# Patient Record
Sex: FEMALE | Race: WHITE | NOT HISPANIC OR LATINO | Employment: FULL TIME | URBAN - METROPOLITAN AREA
[De-identification: names, ages, dates, MRNs, and addresses within clinical notes are randomized per-mention and may not be internally consistent; named-entity substitution may affect disease eponyms.]

---

## 2018-08-21 ENCOUNTER — EMPLOYEE HEALTH (OUTPATIENT)
Dept: OCCUPATIONAL MEDICINE | Facility: CLINIC | Age: 33
End: 2018-08-21

## 2018-08-21 ENCOUNTER — HOSPITAL ENCOUNTER (OUTPATIENT)
Facility: MEDICAL CENTER | Age: 33
End: 2018-08-21
Attending: PREVENTIVE MEDICINE
Payer: COMMERCIAL

## 2018-08-21 ENCOUNTER — APPOINTMENT (OUTPATIENT)
Dept: OCCUPATIONAL MEDICINE | Facility: CLINIC | Age: 33
End: 2018-08-21

## 2018-08-21 VITALS
DIASTOLIC BLOOD PRESSURE: 82 MMHG | TEMPERATURE: 98.3 F | BODY MASS INDEX: 19.77 KG/M2 | SYSTOLIC BLOOD PRESSURE: 122 MMHG | HEIGHT: 66 IN | OXYGEN SATURATION: 96 % | RESPIRATION RATE: 14 BRPM | HEART RATE: 96 BPM | WEIGHT: 123 LBS

## 2018-08-21 DIAGNOSIS — Z02.89 ENCOUNTER FOR OCCUPATIONAL HEALTH EXAMINATION: ICD-10-CM

## 2018-08-21 DIAGNOSIS — Z02.1 PRE-EMPLOYMENT DRUG SCREENING: ICD-10-CM

## 2018-08-21 LAB
AMP AMPHETAMINE: NORMAL
BAR BARBITURATES: NORMAL
BZO BENZODIAZEPINES: NORMAL
COC COCAINE: NORMAL
INT CON NEG: NORMAL
INT CON POS: NORMAL
MDMA ECSTASY: NORMAL
MET METHAMPHETAMINES: NORMAL
MTD METHADONE: NORMAL
OPI OPIATES: NORMAL
OXY OXYCODONE: NORMAL
PCP PHENCYCLIDINE: NORMAL
POC URINE DRUG SCREEN OCDRS: NORMAL
THC: NORMAL

## 2018-08-21 PROCEDURE — 80305 DRUG TEST PRSMV DIR OPT OBS: CPT | Performed by: PREVENTIVE MEDICINE

## 2018-08-21 PROCEDURE — 8915 PR COMPREHENSIVE PHYSICAL: Performed by: PREVENTIVE MEDICINE

## 2018-08-21 PROCEDURE — 94375 RESPIRATORY FLOW VOLUME LOOP: CPT | Performed by: PREVENTIVE MEDICINE

## 2018-08-21 PROCEDURE — 86480 TB TEST CELL IMMUN MEASURE: CPT | Performed by: PREVENTIVE MEDICINE

## 2018-08-21 PROCEDURE — 90707 MMR VACCINE SC: CPT | Performed by: PREVENTIVE MEDICINE

## 2018-08-21 NOTE — PROGRESS NOTES
Patient's body mass index is 19.85 kg/m². Exercise and nutrition counseling were performed at this visit.

## 2018-08-22 LAB
M TB TUBERC IFN-G BLD QL: NEGATIVE
M TB TUBERC IFN-G/MITOGEN IGNF BLD: 0.03
M TB TUBERC IGNF/MITOGEN IGNF CONTROL: 60.17 [IU]/ML
MITOGEN IGNF BCKGRD COR BLD-ACNC: 0.17 [IU]/ML

## 2018-09-13 ENCOUNTER — DOCUMENTATION (OUTPATIENT)
Dept: OCCUPATIONAL MEDICINE | Facility: CLINIC | Age: 33
End: 2018-09-13

## 2018-09-19 ENCOUNTER — IMMUNIZATION (OUTPATIENT)
Dept: OCCUPATIONAL MEDICINE | Facility: CLINIC | Age: 33
End: 2018-09-19
Payer: COMMERCIAL

## 2018-09-19 DIAGNOSIS — Z23 NEED FOR VACCINATION: ICD-10-CM

## 2018-09-19 PROCEDURE — 90686 IIV4 VACC NO PRSV 0.5 ML IM: CPT | Performed by: PREVENTIVE MEDICINE

## 2018-09-21 ENCOUNTER — EH NON-PROVIDER (OUTPATIENT)
Dept: OCCUPATIONAL MEDICINE | Facility: CLINIC | Age: 33
End: 2018-09-21

## 2018-09-21 DIAGNOSIS — Z23 NEED FOR VACCINATION: ICD-10-CM

## 2018-09-21 PROCEDURE — 90707 MMR VACCINE SC: CPT | Performed by: PREVENTIVE MEDICINE

## 2018-09-21 NOTE — PROGRESS NOTES
"Divina Hassan is a 33 y.o. female here for a non-provider visit for:   MMR 2 of 2    Reason for immunization: lack of immunity demonstrated on prior labs or testing  Immunization records indicate need for vaccine: Yes, confirmed with Epic  Minimum interval has been met for this vaccine: Yes  ABN completed: Not Indicated    Order and dose verified by: AED  VIS Dated  2- was given to patient: Yes  All IAC Questionnaire questions were answered \"No.\"    Patient tolerated injection and no adverse effects were observed or reported: Yes    Pt scheduled for next dose in series: Not Indicated    "

## 2018-12-10 ENCOUNTER — OFFICE VISIT (OUTPATIENT)
Dept: URGENT CARE | Facility: CLINIC | Age: 33
End: 2018-12-10
Payer: COMMERCIAL

## 2018-12-10 VITALS
TEMPERATURE: 97.6 F | BODY MASS INDEX: 19.77 KG/M2 | RESPIRATION RATE: 16 BRPM | DIASTOLIC BLOOD PRESSURE: 60 MMHG | WEIGHT: 123 LBS | HEIGHT: 66 IN | SYSTOLIC BLOOD PRESSURE: 106 MMHG | HEART RATE: 66 BPM | OXYGEN SATURATION: 99 %

## 2018-12-10 DIAGNOSIS — R10.9 BELLY PAIN: ICD-10-CM

## 2018-12-10 LAB
APPEARANCE UR: CLEAR
BILIRUB UR STRIP-MCNC: NEGATIVE MG/DL
COLOR UR AUTO: YELLOW
GLUCOSE UR STRIP.AUTO-MCNC: NEGATIVE MG/DL
INT CON NEG: NEGATIVE
INT CON POS: POSITIVE
KETONES UR STRIP.AUTO-MCNC: NEGATIVE MG/DL
LEUKOCYTE ESTERASE UR QL STRIP.AUTO: NEGATIVE
NITRITE UR QL STRIP.AUTO: NEGATIVE
PH UR STRIP.AUTO: 5.5 [PH] (ref 5–8)
POC URINE PREGNANCY TEST: NEGATIVE
PROT UR QL STRIP: NEGATIVE MG/DL
RBC UR QL AUTO: NEGATIVE
SP GR UR STRIP.AUTO: 1
UROBILINOGEN UR STRIP-MCNC: 0.2 MG/DL

## 2018-12-10 PROCEDURE — 81002 URINALYSIS NONAUTO W/O SCOPE: CPT | Performed by: FAMILY MEDICINE

## 2018-12-10 PROCEDURE — 81025 URINE PREGNANCY TEST: CPT | Performed by: FAMILY MEDICINE

## 2018-12-10 PROCEDURE — 99204 OFFICE O/P NEW MOD 45 MIN: CPT | Performed by: FAMILY MEDICINE

## 2018-12-10 RX ORDER — NORGESTIMATE AND ETHINYL ESTRADIOL 0.25-0.035
1 KIT ORAL DAILY
COMMUNITY
End: 2020-06-15

## 2018-12-10 ASSESSMENT — ENCOUNTER SYMPTOMS
DIARRHEA: 0
NAUSEA: 0
DIZZINESS: 0
VOMITING: 0
BLOOD IN STOOL: 0
FEVER: 0
CHILLS: 0
ABDOMINAL PAIN: 1
FOCAL WEAKNESS: 0

## 2018-12-11 NOTE — PROGRESS NOTES
"Subjective:      Divina Hassan is a 33 y.o. female who presents with GI Problem (x2 weeks RRQ, comes and goes, cramping pain, when it first started it radiates to the left side. )    - This is a very pleasant, well and non-toxic appearing 33 y.o. female with complaints of RUQ pain constant x 2-3 wks. Nothing makes it better or worse. No NVFC (vomited once after thanks giving dinner ). No stool changes or urinary symptoms or weight changes. Eating well           ALLERGIES:  Patient has no known allergies.     PMH:  History reviewed. No pertinent past medical history.     MEDS:    Current Outpatient Prescriptions:   •  norgestimate-ethinyl estradiol (SPRINTEC 28) 0.25-35 MG-MCG per tablet, Take 1 Tab by mouth every day., Disp: , Rfl:   •  estropipate (OGEN) 1.5 MG Tab, Take 1.5 mg by mouth every day., Disp: , Rfl:     ** I have documented what I find to be significant in regards to past medical, social, family and surgical history  in my HPI or under PMH/PSH/FH review section, otherwise it is contributory **             HPI    Review of Systems   Constitutional: Negative for chills and fever.   Gastrointestinal: Positive for abdominal pain. Negative for blood in stool, diarrhea, nausea and vomiting.   Neurological: Negative for dizziness and focal weakness.          Objective:     /60   Pulse 66   Temp 36.4 °C (97.6 °F) (Temporal)   Resp 16   Ht 1.676 m (5' 6\")   Wt 55.8 kg (123 lb)   SpO2 99%   BMI 19.85 kg/m²      Physical Exam   Constitutional: She appears well-developed. No distress.   HENT:   Head: Normocephalic and atraumatic.   Mouth/Throat: Oropharynx is clear and moist.   Eyes: Conjunctivae are normal.   Neck: Neck supple.   Cardiovascular: Regular rhythm.    No murmur heard.  Pulmonary/Chest: Effort normal and breath sounds normal. No respiratory distress.   Abdominal: Soft. Bowel sounds are normal. She exhibits no distension. There is no tenderness. There is no rebound and no guarding. "   Neurological: She is alert. She exhibits normal muscle tone.   Skin: Skin is warm and dry.   Psychiatric: She has a normal mood and affect. Judgment normal.   Nursing note and vitals reviewed.              Assessment/Plan:         1. Belly pain  POCT Urinalysis    POCT Pregnancy    CBC WITH DIFFERENTIAL    COMP METABOLIC PANEL    TSH WITH REFLEX TO FT4    LIPASE    US-ABDOMEN COMPLETE SURVEY    REFERRAL TO GASTROENTEROLOGY             Dx & d/c instructions discussed w/ patient and/or family members.     ER precautions (worsening signs symptoms and when to go to ER) discussed.    Follow up w/ PCP in 2-3 days to make sure symptoms improving and no further intervention/treatment and/or work-up needed was advised, ER if feeling worse or not improving in 2 days.    Possible side effects (i.e. Rash, GI upset/constipation, sedation, elevation of BP or sugars) of any medications given discussed.     Patient left in stable condition

## 2018-12-12 ENCOUNTER — HOSPITAL ENCOUNTER (OUTPATIENT)
Dept: LAB | Facility: MEDICAL CENTER | Age: 33
End: 2018-12-12
Attending: FAMILY MEDICINE
Payer: COMMERCIAL

## 2018-12-12 DIAGNOSIS — R10.9 BELLY PAIN: ICD-10-CM

## 2018-12-12 LAB
ALBUMIN SERPL BCP-MCNC: 4.2 G/DL (ref 3.2–4.9)
ALBUMIN/GLOB SERPL: 1.4 G/DL
ALP SERPL-CCNC: 39 U/L (ref 30–99)
ALT SERPL-CCNC: 15 U/L (ref 2–50)
ANION GAP SERPL CALC-SCNC: 8 MMOL/L (ref 0–11.9)
AST SERPL-CCNC: 19 U/L (ref 12–45)
BASOPHILS # BLD AUTO: 1 % (ref 0–1.8)
BASOPHILS # BLD: 0.06 K/UL (ref 0–0.12)
BILIRUB SERPL-MCNC: 0.7 MG/DL (ref 0.1–1.5)
BUN SERPL-MCNC: 11 MG/DL (ref 8–22)
CALCIUM SERPL-MCNC: 10 MG/DL (ref 8.5–10.5)
CHLORIDE SERPL-SCNC: 102 MMOL/L (ref 96–112)
CO2 SERPL-SCNC: 27 MMOL/L (ref 20–33)
CREAT SERPL-MCNC: 0.86 MG/DL (ref 0.5–1.4)
EOSINOPHIL # BLD AUTO: 0.13 K/UL (ref 0–0.51)
EOSINOPHIL NFR BLD: 2.2 % (ref 0–6.9)
ERYTHROCYTE [DISTWIDTH] IN BLOOD BY AUTOMATED COUNT: 38.9 FL (ref 35.9–50)
GLOBULIN SER CALC-MCNC: 2.9 G/DL (ref 1.9–3.5)
GLUCOSE SERPL-MCNC: 82 MG/DL (ref 65–99)
HCT VFR BLD AUTO: 42.2 % (ref 37–47)
HGB BLD-MCNC: 14.1 G/DL (ref 12–16)
IMM GRANULOCYTES # BLD AUTO: 0.01 K/UL (ref 0–0.11)
IMM GRANULOCYTES NFR BLD AUTO: 0.2 % (ref 0–0.9)
LIPASE SERPL-CCNC: 43 U/L (ref 11–82)
LYMPHOCYTES # BLD AUTO: 1.9 K/UL (ref 1–4.8)
LYMPHOCYTES NFR BLD: 31.7 % (ref 22–41)
MCH RBC QN AUTO: 29.3 PG (ref 27–33)
MCHC RBC AUTO-ENTMCNC: 33.4 G/DL (ref 33.6–35)
MCV RBC AUTO: 87.7 FL (ref 81.4–97.8)
MONOCYTES # BLD AUTO: 0.38 K/UL (ref 0–0.85)
MONOCYTES NFR BLD AUTO: 6.3 % (ref 0–13.4)
NEUTROPHILS # BLD AUTO: 3.51 K/UL (ref 2–7.15)
NEUTROPHILS NFR BLD: 58.6 % (ref 44–72)
NRBC # BLD AUTO: 0 K/UL
NRBC BLD-RTO: 0 /100 WBC
PLATELET # BLD AUTO: 261 K/UL (ref 164–446)
PMV BLD AUTO: 9.7 FL (ref 9–12.9)
POTASSIUM SERPL-SCNC: 4.1 MMOL/L (ref 3.6–5.5)
PROT SERPL-MCNC: 7.1 G/DL (ref 6–8.2)
RBC # BLD AUTO: 4.81 M/UL (ref 4.2–5.4)
SODIUM SERPL-SCNC: 137 MMOL/L (ref 135–145)
TSH SERPL DL<=0.005 MIU/L-ACNC: 2.13 UIU/ML (ref 0.38–5.33)
WBC # BLD AUTO: 6 K/UL (ref 4.8–10.8)

## 2018-12-12 PROCEDURE — 85025 COMPLETE CBC W/AUTO DIFF WBC: CPT

## 2018-12-12 PROCEDURE — 83690 ASSAY OF LIPASE: CPT

## 2018-12-12 PROCEDURE — 80053 COMPREHEN METABOLIC PANEL: CPT

## 2018-12-12 PROCEDURE — 84443 ASSAY THYROID STIM HORMONE: CPT

## 2018-12-12 PROCEDURE — 36415 COLL VENOUS BLD VENIPUNCTURE: CPT

## 2019-04-09 ENCOUNTER — HOSPITAL ENCOUNTER (OUTPATIENT)
Dept: LAB | Facility: MEDICAL CENTER | Age: 34
End: 2019-04-09
Attending: OBSTETRICS & GYNECOLOGY
Payer: COMMERCIAL

## 2019-04-09 LAB
HBV SURFACE AG SER QL: NEGATIVE
HIV 1+2 AB+HIV1 P24 AG SERPL QL IA: NON REACTIVE
TREPONEMA PALLIDUM IGG+IGM AB [PRESENCE] IN SERUM OR PLASMA BY IMMUNOASSAY: NON REACTIVE

## 2019-04-09 PROCEDURE — 87389 HIV-1 AG W/HIV-1&-2 AB AG IA: CPT

## 2019-04-09 PROCEDURE — 87340 HEPATITIS B SURFACE AG IA: CPT

## 2019-04-09 PROCEDURE — 86780 TREPONEMA PALLIDUM: CPT

## 2019-05-28 ENCOUNTER — OFFICE VISIT (OUTPATIENT)
Dept: URGENT CARE | Facility: CLINIC | Age: 34
End: 2019-05-28
Payer: COMMERCIAL

## 2019-05-28 ENCOUNTER — APPOINTMENT (OUTPATIENT)
Dept: RADIOLOGY | Facility: IMAGING CENTER | Age: 34
End: 2019-05-28
Attending: PHYSICIAN ASSISTANT
Payer: COMMERCIAL

## 2019-05-28 VITALS
HEIGHT: 66 IN | HEART RATE: 88 BPM | OXYGEN SATURATION: 97 % | DIASTOLIC BLOOD PRESSURE: 74 MMHG | SYSTOLIC BLOOD PRESSURE: 118 MMHG | TEMPERATURE: 97.5 F | WEIGHT: 127 LBS | RESPIRATION RATE: 16 BRPM | BODY MASS INDEX: 20.41 KG/M2

## 2019-05-28 DIAGNOSIS — M79.672 PAIN OF LEFT HEEL: ICD-10-CM

## 2019-05-28 PROCEDURE — 73650 X-RAY EXAM OF HEEL: CPT | Mod: TC,LT | Performed by: PHYSICIAN ASSISTANT

## 2019-05-28 PROCEDURE — 99214 OFFICE O/P EST MOD 30 MIN: CPT | Performed by: PHYSICIAN ASSISTANT

## 2019-05-28 RX ORDER — IBUPROFEN 600 MG/1
600 TABLET ORAL EVERY 6 HOURS PRN
COMMUNITY
End: 2020-06-15

## 2019-05-28 ASSESSMENT — ENCOUNTER SYMPTOMS
TINGLING: 0
SENSORY CHANGE: 0
FALLS: 0
JOINT SWELLING: 0

## 2019-05-28 NOTE — PROGRESS NOTES
"Subjective:      Divina Hassan is a 34 y.o. female who presents with Other (pt states she was running and her (L) heel started hurting and radiating lateral)            Patient is a pleasant 34-year-old female who presents to urgent care with left heel pain for the last 2 weeks.  Patient reports that she is an avid runner of which approximately 2 weeks ago she was running on the treadmill when she developed acute heel pain and stopped running.  Since then she reports worsening heel pain now radiating to the lateral aspect of the foot with any weightbearing.  Patient denies any swelling, skin changes or history of same.  Patient also has been utilizing shoe inserts without relief his symptoms.      Other   This is a new problem. Episode onset: 2 weeks ago. The problem occurs constantly. The problem has been unchanged. Pertinent negatives include no joint swelling. The symptoms are aggravated by walking (Weightbearing). She has tried rest for the symptoms. The treatment provided no relief.       Review of Systems   Musculoskeletal: Positive for joint pain. Negative for falls and joint swelling.   Neurological: Negative for tingling and sensory change.   All other systems reviewed and are negative.         Objective:     /74 (BP Location: Left arm, Patient Position: Sitting, BP Cuff Size: Adult)   Pulse 88   Temp 36.4 °C (97.5 °F) (Temporal)   Resp 16   Ht 1.676 m (5' 6\")   Wt 57.6 kg (127 lb)   SpO2 97%   BMI 20.50 kg/m²    PMH:  has no past medical history on file.  MEDS:   Current Outpatient Prescriptions:   •  ibuprofen (MOTRIN) 600 MG Tab, Take 600 mg by mouth every 6 hours as needed., Disp: , Rfl:   •  norgestimate-ethinyl estradiol (SPRINTEC 28) 0.25-35 MG-MCG per tablet, Take 1 Tab by mouth every day., Disp: , Rfl:   •  estropipate (OGEN) 1.5 MG Tab, Take 1.5 mg by mouth every day., Disp: , Rfl:   ALLERGIES: No Known Allergies  SURGHX: No past surgical history on file.  SOCHX:  reports that " she has never smoked. She has never used smokeless tobacco. She reports that she drinks alcohol. She reports that she does not use drugs.  FH: Family history was reviewed, no pertinent findings to report    Physical Exam   Constitutional: She is oriented to person, place, and time. She appears well-developed and well-nourished. No distress.   HENT:   Head: Normocephalic and atraumatic.   Eyes: Pupils are equal, round, and reactive to light. EOM are normal.   Neck: Normal range of motion. Neck supple. No tracheal deviation present.   Cardiovascular: Normal rate.    Pulmonary/Chest: Effort normal.   Musculoskeletal:        Left foot: There is tenderness and bony tenderness. There is normal range of motion and no swelling.        Feet:    Left heel without noted erythema, edema or foreign body.  Tenderness at the base of the heel and to the caudal aspect of the fifth metatarsal.  Dorsi plantarflexion intact.  Achilles nontender.  Neurovascularly intact.  Digits nontender.   Neurological: She is alert and oriented to person, place, and time.   Skin: Skin is warm. No rash noted.   Psychiatric: She has a normal mood and affect. Her behavior is normal. Judgment and thought content normal.   Vitals reviewed.              Assessment/Plan:     1. Pain of left heel  - DX-OS CALCIS (HEEL) 2+ LEFT; Future  - REFERRAL TO SPORTS MEDICINE  Xr heel:  No acute fracture.  Normal trabecular pattern.  No bony spurring.  Achilles shadow is within normal limits.    I have reviewed the xray and agree with the official read.  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  Patient was placed in a walking boot with notable improvement of ambulation.  Referral to sports medicine was made today for further evaluation.  Symptoms consistent with stress fracture versus plantar fasciitis however patient does not feel that the pain is in the distribution or plantar fasciitis.  Encourage patient to follow-up with sports  medicine.  Patient given precautionary s/sx that mandate immediate follow up and evaluation in the ED. Advised of risks of not doing so.    DDX, Supportive care, and indications for immediate follow-up discussed with patient.    Instructed to return to clinic or nearest emergency department if we are not available for any change in condition, further concerns, or worsening of symptoms.    The patient demonstrated a good understanding and agreed with the treatment plan.  Please note that this dictation was created using voice recognition software. I have made every reasonable attempt to correct obvious errors, but I expect that there are errors of grammar and possibly content that I did not discover before finalizing the note.

## 2019-09-25 ENCOUNTER — HOSPITAL ENCOUNTER (OUTPATIENT)
Dept: RADIOLOGY | Facility: MEDICAL CENTER | Age: 34
End: 2019-09-25
Attending: OBSTETRICS & GYNECOLOGY
Payer: COMMERCIAL

## 2019-09-25 DIAGNOSIS — N64.4 BREAST PAIN: ICD-10-CM

## 2019-09-25 PROCEDURE — 76642 ULTRASOUND BREAST LIMITED: CPT | Mod: RT

## 2019-10-03 ENCOUNTER — IMMUNIZATION (OUTPATIENT)
Dept: OCCUPATIONAL MEDICINE | Facility: CLINIC | Age: 34
End: 2019-10-03

## 2019-10-03 DIAGNOSIS — Z23 NEED FOR VACCINATION: ICD-10-CM

## 2019-10-03 PROCEDURE — 90686 IIV4 VACC NO PRSV 0.5 ML IM: CPT | Performed by: NURSE PRACTITIONER

## 2020-06-15 ENCOUNTER — TELEMEDICINE (OUTPATIENT)
Dept: MEDICAL GROUP | Facility: IMAGING CENTER | Age: 35
End: 2020-06-15
Payer: COMMERCIAL

## 2020-06-15 VITALS — HEIGHT: 66 IN | BODY MASS INDEX: 20.89 KG/M2 | WEIGHT: 130 LBS | RESPIRATION RATE: 12 BRPM

## 2020-06-15 DIAGNOSIS — D17.1 LIPOMA OF TORSO: ICD-10-CM

## 2020-06-15 DIAGNOSIS — Z30.011 ENCOUNTER FOR INITIAL PRESCRIPTION OF CONTRACEPTIVE PILLS: ICD-10-CM

## 2020-06-15 DIAGNOSIS — Z12.83 SKIN CANCER SCREENING: ICD-10-CM

## 2020-06-15 PROCEDURE — 99213 OFFICE O/P EST LOW 20 MIN: CPT | Performed by: FAMILY MEDICINE

## 2020-06-15 RX ORDER — NORETHINDRONE ACETATE AND ETHINYL ESTRADIOL .02; 1 MG/1; MG/1
1 TABLET ORAL DAILY
Qty: 84 TAB | Refills: 3 | Status: SHIPPED | OUTPATIENT
Start: 2020-06-15 | End: 2020-07-13

## 2020-06-15 RX ORDER — M-VIT,TX,IRON,MINS/CALC/FOLIC 27MG-0.4MG
1 TABLET ORAL DAILY
COMMUNITY
End: 2021-08-26

## 2020-06-15 ASSESSMENT — PAIN SCALES - GENERAL: PAINLEVEL: NO PAIN

## 2020-06-15 ASSESSMENT — FIBROSIS 4 INDEX: FIB4 SCORE: 0.62

## 2020-06-15 NOTE — PROGRESS NOTES
Telemedicine Visit: New Patient     This encounter was conducted via Zoom .   Verbal consent was obtained. Patient's identity was verified. Patient aware this will be   billed the same as an in person evaluation.    Subjective:     Chief Complaint   Patient presents with   • Establish Care     Moved to [place] 2 years ago moved from [place].   • Contraception     change birth control.  low dose. bad period symptoms.    • Referral Needed     dermatology. lipoma on her back.    • Other     Covid positive in march. since tested negative. donated plasma. 117/72.    • Other     Dr. Pepper at [facility]. wednesday appt. last pap smear 2 years ago. 1 abnormal pap in 2013. AbhijitAnnette Hassan is a pleasant 35 y.o. female with no chronic medical conditions on virtual visit to discuss referral to derm and changing her birth control.     Would like to go to a low dose birth control bc of heavy bleeding and headaches and cramping that occur when she is on her off week of birth control.  Does get migraine headaches though denies auras.  She is a neurology nurse practitioner.     Would like referral due lipomas on back would like removed.  Has some discomfort due to bra strap and location on the scapula.    Migraines with aura: She gets migraines on her withdrawal week though no aura  Liver disease: None  Personal or family history of stroke, pulmonary embolism, DVT, or any blood disorders in the family: None  Smoking: None  Sedentary lifestyle: Active    ROS  See HPI  Constitutional: Negative for fever, chills and malaise/fatigue.   HENT: Negative for congestion, itchy watery eyes  Eyes: Negative for pain or sudden changes in vision  Respiratory: Negative for cough and shortness of breath.    Cardiovascular: Negative for leg swelling or chest pain  Gastrointestinal: Negative for nausea, vomiting, abdominal pain and diarrhea.   Genitourinary: Negative for dysuria and hematuria.   Skin: Negative for rash or concerning moles  "  Neurological: Negative for dizziness, focal weakness   Psychiatric/Behavioral: Negative for depression.  The patient is not nervous/anxious.    Musculoskeletal: no weakness or joint stiffness    No Known Allergies    Current medicines (including changes today)  Current Outpatient Medications   Medication Sig Dispense Refill   • therapeutic multivitamin-minerals (THERAGRAN-M) Tab Take 1 Tab by mouth every day.     • norgestimate-ethinyl estradiol (SPRINTEC 28) 0.25-35 MG-MCG per tablet Take 1 Tab by mouth every day.       No current facility-administered medications for this visit.        She  has no past medical history on file.  She  has no past surgical history on file.      History reviewed. No pertinent family history.  No family status information on file.       There are no active problems to display for this patient.         Objective:   Vitals obtained by patient:  Resp 12   Ht 1.676 m (5' 6\")   Wt 59 kg (130 lb)   BMI 20.98 kg/m²     Physical Exam:  Constitutional: Alert, no distress, well-groomed.  Skin: No rashes in visible areas.  Eye: Round. Conjunctiva clear, lids normal. No icterus.   ENMT: Lips pink without lesions, good dentition, moist mucous membranes. Phonation normal.  Neck: No masses, no thyromegaly. Moves freely without pain.  CV: Pulse as reported by patient  Respiratory: Unlabored respiratory effort, no cough or audible wheeze  Psych: Alert and oriented x3, normal affect and mood.   Neuro: symmetric face. Alert and oriented. Follows commands. No aphasia or dysarthria.    Labs:  No visits with results within 1 Month(s) from this visit.   Latest known visit with results is:   Hospital Outpatient Visit on 04/09/2019   Component Date Value Ref Range Status   • Syphilis, Treponemal Qual 04/09/2019 Non Reactive  Non Reactive Final    Comment: Result of Non-reactive indicates no serologic evidence of  infection with Treponema pallidum.  (Incubating or early  primary syphilis cannot be " excluded).     • Hepatitis B Surface Antigen 04/09/2019 Negative  Negative Final    Comment: It has been reported that certain assays will not detect all HBV  mutants.  If acute or chronic HBV infection is suspected and the  HBsAg result is negative, it is recommended that other serological  markers be tested to confirm the HBsAg nonreactivity.  HBsAg test  results should always be assessed in conjunction with the patient's  medical history, clinical examination, and other findings.     • HIV Ag/Ab Combo Assay 04/09/2019 Non Reactive  Non Reactive Final    Comment: Screen is NEGATIVE for p24 antigen and HIV 1/O/2 antibodies.  A negative screen does not preclude the possibility of exposure  or infection.  Consider retesting in high-risk patients, if  clinically indicated.       Imaging:   No results found.    Assessment and Plan:   The following treatment plan was discussed:   Recent lab work-up April 2020 with negative STD panel normal thyroid function CBC CMP normal  Divina prefers a lower hormone dose birth control given that her bmi is 20 I think this is reasonable. Will give the new bcp 3-6 months to see if it works better for her.   She will start after her next period to minimize throwing her cycle off. Caution to be used when switching birth control such as condoms  Problem List Items Addressed This Visit     None      Visit Diagnoses     Lipoma of torso        Relevant Orders    REFERRAL TO GENERAL SURGERY    Encounter for initial prescription of contraceptive pills        Relevant Medications    norethindrone-ethinyl estradiol (LOESTRIN 1/20, 21,) 1-20 MG-MCG per tablet    Skin cancer screening        Relevant Orders    REFERRAL TO DERMATOLOGY        Follow-up: Return if symptoms worsen or fail to improve, for annual.        Portions of this note may be dictated using Dragon [employer] Fusion voice recognition software.  Variances in spelling and vocabulary are possible and unintentional.  Not all areas  may be caught/corrected.  Please notify me if any discrepancies are noted or if the meaning of any statement is not correct/clear.

## 2020-07-08 ENCOUNTER — HOSPITAL ENCOUNTER (OUTPATIENT)
Dept: LAB | Facility: MEDICAL CENTER | Age: 35
End: 2020-07-08
Payer: COMMERCIAL

## 2020-07-08 LAB
BDY FAT % MEASURED: 22.4 %
BP DIAS: 79 MMHG
BP SYS: 112 MMHG
CHOLEST SERPL-MCNC: 163 MG/DL (ref 100–199)
DIABETES HTDIA: NO
EVENT NAME HTEVT: NORMAL
FASTING HTFAS: YES
GLUCOSE SERPL-MCNC: 86 MG/DL (ref 65–99)
HDLC SERPL-MCNC: 89 MG/DL
HYPERTENSION HTHYP: NO
LDLC SERPL CALC-MCNC: 64 MG/DL
SCREENING LOC CITY HTCIT: NORMAL
SCREENING LOC STATE HTSTA: NORMAL
SCREENING LOCATION HTLOC: NORMAL
SMOKING HTSMO: NO
SUBSCRIBER ID HTSID: NORMAL
TRIGL SERPL-MCNC: 49 MG/DL (ref 0–149)

## 2020-07-08 PROCEDURE — 82947 ASSAY GLUCOSE BLOOD QUANT: CPT

## 2020-07-08 PROCEDURE — S5190 WELLNESS ASSESSMENT BY NONPH: HCPCS

## 2020-07-08 PROCEDURE — 36415 COLL VENOUS BLD VENIPUNCTURE: CPT

## 2020-07-08 PROCEDURE — 80061 LIPID PANEL: CPT

## 2020-07-12 ENCOUNTER — PATIENT MESSAGE (OUTPATIENT)
Dept: MEDICAL GROUP | Facility: IMAGING CENTER | Age: 35
End: 2020-07-12

## 2020-07-12 DIAGNOSIS — Z30.011 ENCOUNTER FOR INITIAL PRESCRIPTION OF CONTRACEPTIVE PILLS: ICD-10-CM

## 2020-07-13 RX ORDER — NORGESTIMATE AND ETHINYL ESTRADIOL 7DAYSX3 LO
1 KIT ORAL DAILY
Qty: 84 TAB | Refills: 3 | Status: SHIPPED | OUTPATIENT
Start: 2020-07-13 | End: 2021-06-22

## 2020-08-05 ASSESSMENT — FIBROSIS 4 INDEX: FIB4 SCORE: 0.62

## 2020-08-07 ENCOUNTER — OFFICE VISIT (OUTPATIENT)
Dept: ADMISSIONS | Facility: MEDICAL CENTER | Age: 35
End: 2020-08-07
Attending: SURGERY
Payer: COMMERCIAL

## 2020-08-07 DIAGNOSIS — Z01.812 PRE-OPERATIVE LABORATORY EXAMINATION: ICD-10-CM

## 2020-08-07 LAB
COVID ORDER STATUS COVID19: NORMAL
SARS-COV-2 RNA RESP QL NAA+PROBE: NOTDETECTED
SPECIMEN SOURCE: NORMAL

## 2020-08-07 PROCEDURE — U0003 INFECTIOUS AGENT DETECTION BY NUCLEIC ACID (DNA OR RNA); SEVERE ACUTE RESPIRATORY SYNDROME CORONAVIRUS 2 (SARS-COV-2) (CORONAVIRUS DISEASE [COVID-19]), AMPLIFIED PROBE TECHNIQUE, MAKING USE OF HIGH THROUGHPUT TECHNOLOGIES AS DESCRIBED BY CMS-2020-01-R: HCPCS

## 2020-08-12 ENCOUNTER — HOSPITAL ENCOUNTER (OUTPATIENT)
Facility: MEDICAL CENTER | Age: 35
End: 2020-08-12
Attending: SURGERY | Admitting: SURGERY
Payer: COMMERCIAL

## 2020-08-12 ENCOUNTER — ANESTHESIA (OUTPATIENT)
Dept: SURGERY | Facility: MEDICAL CENTER | Age: 35
End: 2020-08-12
Payer: COMMERCIAL

## 2020-08-12 ENCOUNTER — ANESTHESIA EVENT (OUTPATIENT)
Dept: SURGERY | Facility: MEDICAL CENTER | Age: 35
End: 2020-08-12
Payer: COMMERCIAL

## 2020-08-12 VITALS
RESPIRATION RATE: 18 BRPM | HEART RATE: 85 BPM | SYSTOLIC BLOOD PRESSURE: 113 MMHG | HEIGHT: 66 IN | DIASTOLIC BLOOD PRESSURE: 70 MMHG | WEIGHT: 128.97 LBS | OXYGEN SATURATION: 100 % | BODY MASS INDEX: 20.73 KG/M2 | TEMPERATURE: 97.5 F

## 2020-08-12 DIAGNOSIS — G89.18 POST-OP PAIN: ICD-10-CM

## 2020-08-12 LAB
HCG UR QL: NEGATIVE
PATHOLOGY CONSULT NOTE: NORMAL

## 2020-08-12 PROCEDURE — 700105 HCHG RX REV CODE 258: Performed by: SURGERY

## 2020-08-12 PROCEDURE — A9270 NON-COVERED ITEM OR SERVICE: HCPCS | Performed by: SURGERY

## 2020-08-12 PROCEDURE — 160046 HCHG PACU - 1ST 60 MINS PHASE II: Performed by: SURGERY

## 2020-08-12 PROCEDURE — 700101 HCHG RX REV CODE 250: Performed by: ANESTHESIOLOGY

## 2020-08-12 PROCEDURE — 160048 HCHG OR STATISTICAL LEVEL 1-5: Performed by: SURGERY

## 2020-08-12 PROCEDURE — 160039 HCHG SURGERY MINUTES - EA ADDL 1 MIN LEVEL 3: Performed by: SURGERY

## 2020-08-12 PROCEDURE — 700111 HCHG RX REV CODE 636 W/ 250 OVERRIDE (IP)

## 2020-08-12 PROCEDURE — 700111 HCHG RX REV CODE 636 W/ 250 OVERRIDE (IP): Performed by: ANESTHESIOLOGY

## 2020-08-12 PROCEDURE — 160028 HCHG SURGERY MINUTES - 1ST 30 MINS LEVEL 3: Performed by: SURGERY

## 2020-08-12 PROCEDURE — 160009 HCHG ANES TIME/MIN: Performed by: SURGERY

## 2020-08-12 PROCEDURE — 160002 HCHG RECOVERY MINUTES (STAT): Performed by: SURGERY

## 2020-08-12 PROCEDURE — 160025 RECOVERY II MINUTES (STATS): Performed by: SURGERY

## 2020-08-12 PROCEDURE — 700101 HCHG RX REV CODE 250: Performed by: SURGERY

## 2020-08-12 PROCEDURE — 501445 HCHG STAPLER, SKIN DISP: Performed by: SURGERY

## 2020-08-12 PROCEDURE — 81025 URINE PREGNANCY TEST: CPT

## 2020-08-12 PROCEDURE — 501838 HCHG SUTURE GENERAL: Performed by: SURGERY

## 2020-08-12 PROCEDURE — 160035 HCHG PACU - 1ST 60 MINS PHASE I: Performed by: SURGERY

## 2020-08-12 PROCEDURE — 88304 TISSUE EXAM BY PATHOLOGIST: CPT

## 2020-08-12 PROCEDURE — 700102 HCHG RX REV CODE 250 W/ 637 OVERRIDE(OP): Performed by: SURGERY

## 2020-08-12 RX ORDER — HYDROCODONE BITARTRATE AND ACETAMINOPHEN 5; 325 MG/1; MG/1
1-2 TABLET ORAL EVERY 4 HOURS PRN
Qty: 8 TAB | Refills: 0 | Status: SHIPPED | OUTPATIENT
Start: 2020-08-12 | End: 2020-08-14

## 2020-08-12 RX ORDER — SODIUM CHLORIDE, SODIUM LACTATE, POTASSIUM CHLORIDE, CALCIUM CHLORIDE 600; 310; 30; 20 MG/100ML; MG/100ML; MG/100ML; MG/100ML
INJECTION, SOLUTION INTRAVENOUS CONTINUOUS
Status: DISCONTINUED | OUTPATIENT
Start: 2020-08-12 | End: 2020-08-12 | Stop reason: HOSPADM

## 2020-08-12 RX ORDER — OXYCODONE HCL 5 MG/5 ML
5 SOLUTION, ORAL ORAL
Status: DISCONTINUED | OUTPATIENT
Start: 2020-08-12 | End: 2020-08-12 | Stop reason: HOSPADM

## 2020-08-12 RX ORDER — HALOPERIDOL 5 MG/ML
1 INJECTION INTRAMUSCULAR
Status: DISCONTINUED | OUTPATIENT
Start: 2020-08-12 | End: 2020-08-12 | Stop reason: HOSPADM

## 2020-08-12 RX ORDER — DIPHENHYDRAMINE HYDROCHLORIDE 50 MG/ML
12.5 INJECTION INTRAMUSCULAR; INTRAVENOUS
Status: DISCONTINUED | OUTPATIENT
Start: 2020-08-12 | End: 2020-08-12 | Stop reason: HOSPADM

## 2020-08-12 RX ORDER — OXYCODONE HCL 5 MG/5 ML
10 SOLUTION, ORAL ORAL
Status: DISCONTINUED | OUTPATIENT
Start: 2020-08-12 | End: 2020-08-12 | Stop reason: HOSPADM

## 2020-08-12 RX ORDER — CEFAZOLIN SODIUM 1 G/3ML
INJECTION, POWDER, FOR SOLUTION INTRAMUSCULAR; INTRAVENOUS PRN
Status: DISCONTINUED | OUTPATIENT
Start: 2020-08-12 | End: 2020-08-12 | Stop reason: SURG

## 2020-08-12 RX ORDER — HYDROMORPHONE HYDROCHLORIDE 1 MG/ML
0.4 INJECTION, SOLUTION INTRAMUSCULAR; INTRAVENOUS; SUBCUTANEOUS
Status: DISCONTINUED | OUTPATIENT
Start: 2020-08-12 | End: 2020-08-12 | Stop reason: HOSPADM

## 2020-08-12 RX ORDER — ONDANSETRON 2 MG/ML
INJECTION INTRAMUSCULAR; INTRAVENOUS PRN
Status: DISCONTINUED | OUTPATIENT
Start: 2020-08-12 | End: 2020-08-12 | Stop reason: SURG

## 2020-08-12 RX ORDER — BUPIVACAINE HYDROCHLORIDE AND EPINEPHRINE 5; 5 MG/ML; UG/ML
INJECTION, SOLUTION EPIDURAL; INTRACAUDAL; PERINEURAL
Status: DISCONTINUED | OUTPATIENT
Start: 2020-08-12 | End: 2020-08-12 | Stop reason: HOSPADM

## 2020-08-12 RX ORDER — HYDROMORPHONE HYDROCHLORIDE 1 MG/ML
0.1 INJECTION, SOLUTION INTRAMUSCULAR; INTRAVENOUS; SUBCUTANEOUS
Status: DISCONTINUED | OUTPATIENT
Start: 2020-08-12 | End: 2020-08-12 | Stop reason: HOSPADM

## 2020-08-12 RX ORDER — ONDANSETRON 2 MG/ML
4 INJECTION INTRAMUSCULAR; INTRAVENOUS
Status: DISCONTINUED | OUTPATIENT
Start: 2020-08-12 | End: 2020-08-12 | Stop reason: HOSPADM

## 2020-08-12 RX ORDER — KETOROLAC TROMETHAMINE 30 MG/ML
INJECTION, SOLUTION INTRAMUSCULAR; INTRAVENOUS PRN
Status: DISCONTINUED | OUTPATIENT
Start: 2020-08-12 | End: 2020-08-12 | Stop reason: SURG

## 2020-08-12 RX ORDER — DEXAMETHASONE SODIUM PHOSPHATE 4 MG/ML
INJECTION, SOLUTION INTRA-ARTICULAR; INTRALESIONAL; INTRAMUSCULAR; INTRAVENOUS; SOFT TISSUE PRN
Status: DISCONTINUED | OUTPATIENT
Start: 2020-08-12 | End: 2020-08-12 | Stop reason: SURG

## 2020-08-12 RX ORDER — LIDOCAINE HYDROCHLORIDE 10 MG/ML
INJECTION, SOLUTION EPIDURAL; INFILTRATION; INTRACAUDAL; PERINEURAL
Status: COMPLETED
Start: 2020-08-12 | End: 2020-08-12

## 2020-08-12 RX ORDER — HYDROMORPHONE HYDROCHLORIDE 1 MG/ML
0.2 INJECTION, SOLUTION INTRAMUSCULAR; INTRAVENOUS; SUBCUTANEOUS
Status: DISCONTINUED | OUTPATIENT
Start: 2020-08-12 | End: 2020-08-12 | Stop reason: HOSPADM

## 2020-08-12 RX ORDER — MEPERIDINE HYDROCHLORIDE 25 MG/ML
12.5 INJECTION INTRAMUSCULAR; INTRAVENOUS; SUBCUTANEOUS
Status: DISCONTINUED | OUTPATIENT
Start: 2020-08-12 | End: 2020-08-12 | Stop reason: HOSPADM

## 2020-08-12 RX ADMIN — MEPERIDINE HYDROCHLORIDE 12.5 MG: 25 INJECTION INTRAMUSCULAR; INTRAVENOUS; SUBCUTANEOUS at 16:34

## 2020-08-12 RX ADMIN — PROPOFOL 200 MG: 10 INJECTION, EMULSION INTRAVENOUS at 15:27

## 2020-08-12 RX ADMIN — FENTANYL CITRATE 50 MCG: 50 INJECTION INTRAMUSCULAR; INTRAVENOUS at 15:53

## 2020-08-12 RX ADMIN — KETOROLAC TROMETHAMINE 30 MG: 30 INJECTION, SOLUTION INTRAMUSCULAR at 16:06

## 2020-08-12 RX ADMIN — ROCURONIUM BROMIDE 30 MG: 10 INJECTION, SOLUTION INTRAVENOUS at 15:27

## 2020-08-12 RX ADMIN — ONDANSETRON 4 MG: 2 INJECTION INTRAMUSCULAR; INTRAVENOUS at 16:05

## 2020-08-12 RX ADMIN — POVIDONE-IODINE 15 ML: 10 SOLUTION TOPICAL at 13:21

## 2020-08-12 RX ADMIN — SODIUM CHLORIDE, POTASSIUM CHLORIDE, SODIUM LACTATE AND CALCIUM CHLORIDE: 600; 310; 30; 20 INJECTION, SOLUTION INTRAVENOUS at 13:23

## 2020-08-12 RX ADMIN — FENTANYL CITRATE 50 MCG: 50 INJECTION INTRAMUSCULAR; INTRAVENOUS at 15:27

## 2020-08-12 RX ADMIN — Medication 0.5 ML: at 13:23

## 2020-08-12 RX ADMIN — PROPOFOL 100 MG: 10 INJECTION, EMULSION INTRAVENOUS at 15:40

## 2020-08-12 RX ADMIN — DEXAMETHASONE SODIUM PHOSPHATE 4 MG: 4 INJECTION, SOLUTION INTRA-ARTICULAR; INTRALESIONAL; INTRAMUSCULAR; INTRAVENOUS; SOFT TISSUE at 16:05

## 2020-08-12 RX ADMIN — SODIUM CHLORIDE, POTASSIUM CHLORIDE, SODIUM LACTATE AND CALCIUM CHLORIDE: 600; 310; 30; 20 INJECTION, SOLUTION INTRAVENOUS at 15:27

## 2020-08-12 RX ADMIN — SUGAMMADEX 200 MG: 100 INJECTION, SOLUTION INTRAVENOUS at 16:00

## 2020-08-12 RX ADMIN — LIDOCAINE HYDROCHLORIDE 0.5 ML: 10 INJECTION, SOLUTION EPIDURAL; INFILTRATION; INTRACAUDAL at 13:23

## 2020-08-12 RX ADMIN — CEFAZOLIN 2 G: 330 INJECTION, POWDER, FOR SOLUTION INTRAMUSCULAR; INTRAVENOUS at 15:39

## 2020-08-12 RX ADMIN — PROPOFOL 100 MG: 10 INJECTION, EMULSION INTRAVENOUS at 15:53

## 2020-08-12 ASSESSMENT — FIBROSIS 4 INDEX: FIB4 SCORE: 0.62

## 2020-08-12 ASSESSMENT — PAIN SCALES - GENERAL: PAIN_LEVEL: 3

## 2020-08-12 NOTE — OP REPORT
08/12/20    OPERATIVE NOTE    PRE-OPERATIVE DIAGNOSIS - Large Lipoma Back     POST-OPERATIVE DIAGNOSIS - Same    PROCEDURE PERFORMED - Excision large lipoma back 11cm X 6cm     SURGEON - Juve Jensen M.D.    ASSISTANT - SINAN Rubio    TYPE OF ANESTHESIA - General     ANESTHESIOLOGIST  - Dr. Karimi       SPECIMENS - Lipoma       PROCEDURE IN DETAIL -     Patient was taken to the operating suite placed in the supine position.  After adequate anesthesia the patient was repositioned in the prone position.  Great care was taken to offload any pressure points and keep the patient safe.  The patient's back was prepped and draped in sterile fashion.  An incision was made in transverse fashion overlying the mass which was on the right aspect of the patient's back overlying the scapula.  The incision was carried down to the subcutaneous tissue and the lipoma was easily identified and was noted to be fairly well encapsulated.  The lipoma was excised in its entirety using electrocautery and blunt dissection.  The lipoma did not track underneath the scapula.  Once the lipoma was excised completely which measured 11 cm x 6 cm the area was irrigated with copious amounts of normal saline and hemostasis was confirmed.  Half percent Marcaine with epinephrine was infiltrated into the surrounding tissue for postoperative comfort.  3-0 Vicryl subcutaneous sutures were placed in a 4-0 strata fix suture was then used to reapproximate the skin incision.  Benzoin Steri-Strips and sterile dressings were applied.          Juve Jensen M.D.

## 2020-08-12 NOTE — H&P
General Surgery H&P  -------------------------------------------------------------------------------------------------  Date: 8/12/2020    Surgeon: Emanuel Peck M.D. - [facility]  -------------------------------------------------------------------------------------------------    HPI:  This is a 35 y.o. female who is presenting today for excision of a mass suspicious for lipoma on her back.  No specific complaints at this time. Questions addressed by Dr. Jensen.      History reviewed. No pertinent past medical history.    History reviewed. No pertinent surgical history.    Current Facility-Administered Medications   Medication Dose Route Frequency Provider Last Rate Last Dose   • lidocaine (XYLOCAINE) 1 % injection 0.5 mL  0.5 mL Intradermal Once PRN Juve Jensen M.D.   0.5 mL at 08/12/20 1323   • lactated ringers infusion   Intravenous Continuous Juve Jensen M.D. 10 mL/hr at 08/12/20 1323         Social History     Socioeconomic History   • Marital status: Single     Spouse name: Not on file   • Number of children: Not on file   • Years of education: Not on file   • Highest education level: Not on file   Occupational History   • Not on file   Social Needs   • Financial resource strain: Not on file   • Food insecurity     Worry: Not on file     Inability: Not on file   • Transportation needs     Medical: Not on file     Non-medical: Not on file   Tobacco Use   • Smoking status: Never Smoker   • Smokeless tobacco: Never Used   Substance and Sexual Activity   • Alcohol use: Yes     Comment: 1 per week   • Drug use: No   • Sexual activity: Yes     Partners: Male   Lifestyle   • Physical activity     Days per week: Not on file     Minutes per session: Not on file   • Stress: Not on file   Relationships   • Social connections     Talks on phone: Not on file     Gets together: Not on file     Attends Restoration service: Not on file     Active member of club or organization: Not on file     Attends  "meetings of clubs or organizations: Not on file     Relationship status: Not on file   • Intimate partner violence     Fear of current or ex partner: Not on file     Emotionally abused: Not on file     Physically abused: Not on file     Forced sexual activity: Not on file   Other Topics Concern   • Not on file   Social History Narrative   • Not on file       History reviewed. No pertinent family history.    Allergies:  Patient has no known allergies.    Review of Systems:  Noncontributory except as per HPI    Physical Exam:  /82   Pulse 86   Temp 36.7 °C (98 °F) (Temporal)   Resp 18   Ht 1.676 m (5' 6\")   Wt 58.5 kg (128 lb 15.5 oz)   SpO2 100%     Constitutional: Alert, oriented, no acute distress  HEENT:  Normocephalic and atraumatic, EOMI  Neck:   Supple, no JVD,   Cardiovascular: Regular rate and rhythm,   Pulmonary:  Good air entry bilaterally,    Abdominal:  Soft, non-tender, non-distended     Aortic impulse not widened  Musculoskeletal: No edema, no tenderness  Neurological:  CN II-XII grossly intact, no focal deficits  Skin:   Skin is warm and dry. No rash noted.  Psychiatric:  Normal mood and affect.    Labs:                    Assessment/Plan:  This is a 35 y.o. female here today for excision of a back mass.      I explained the details of the operation, alternatives, and potential risks, including but not limited to bleeding, infection, and risks of anesthesia.  All questions were answered. Patient understands and agrees to proceed.    "

## 2020-08-12 NOTE — ANESTHESIA POSTPROCEDURE EVALUATION
Patient: Divina Hassan    Procedure Summary     Date: 08/12/20 Room / Location: [facility] 08 / SURGERY [facility]    Anesthesia Start: 1527 Anesthesia Stop: 1602    Procedure: EXCISION, LIPOMA- UPPER BACK (Right Back) Diagnosis: (LIPOMA OF BACK)    Surgeon: Juve Jensen M.D. Responsible Provider: Aba Karimi M.D.    Anesthesia Type: general ASA Status: 1          Final Anesthesia Type: general  Last vitals  BP   Blood Pressure: (!) 86/56    Temp   36.9 °C (98.5 °F)    Pulse   Pulse: 78   Resp   20    SpO2   100 %      Anesthesia Post Evaluation    Patient location during evaluation: PACU  Patient participation: complete - patient participated  Level of consciousness: awake and alert  Pain score: 3    Airway patency: patent  Anesthetic complications: no  Cardiovascular status: adequate and hemodynamically stable  Respiratory status: acceptable  Hydration status: acceptable    PONV: none

## 2020-08-12 NOTE — ANESTHESIA QCDR
2019 [facility] Clinical Data Registry (for Quality Improvement)     Postoperative nausea/vomiting risk protocol (Adult = 18 yrs and Pediatric 3-17 yrs)- (430 and 463)  General inhalation anesthetic (NOT TIVA) with PONV risk factors: Yes  Provision of anti-emetic therapy with at least 2 different classes of agents: Yes   Patient DID NOT receive anti-emetic therapy and reason is documented in Medical Record:  N/A    Multimodal Pain Management- (477)  Non-emergent surgery AND patient age >= 18:   Use of Multimodal Pain Management, two or more drugs and/or interventions, NOT including systemic opioids:   Exception: Documented allergy to multiple classes of analgesics:     Smoking Abstinence (404)  Patient is current smoker (cigarette, pipe, e-cig, marijuanna):   Elective Surgery:   Abstinence instructions provided prior to day of surgery:   Patient abstained from smoking on day of surgery:     Pre-Op Beta-Blocker in Isolated CABG (44)  Isolated CABG AND patient age >= 18:   Beta-blocker admin within 24 hours of surgical incision:   Exception:of medical reason(s) for not administering beta blocker within 24 hours prior to surgical incision (e.g., not  indicated,other medical reason):     PACU assessment of acute postoperative pain prior to Anesthesia Care End- Applies to Patients Age = 18- (ABG7)  Initial PACU pain score is which of the following: < 7/10  Patient unable to report pain score: N/A    Post-anesthetic transfer of care checklist/protocol to PACU/ICU- (426 and 427)  Upon conclusion of case, patient transferred to which of the following locations: PACU/Non-ICU  Use of transfer checklist/protocol: Yes  Exclusion: Service Performed in Patient Hospital Room (and thus did not require transfer): N/A  Unplanned admission to ICU related to anesthesia service up through end of PACU care- (MD51)  Unplanned admission to ICU (not initially anticipated at anesthesia start time): No

## 2020-08-12 NOTE — OR NURSING
Patient has done well in recovery. She denies pain or nausea. Tolerating po well. Scripts are on the chart and her sister has been updated.

## 2020-08-12 NOTE — ANESTHESIA TIME REPORT
Anesthesia Start and Stop Event Times     Date Time Event    8/12/2020 1320 Ready for Procedure     1527 Anesthesia Start     1602 Anesthesia Stop        Responsible Staff  08/12/20    Name Role Begin End    Aba Karimi M.D. Anesth 1527 1602        Preop Diagnosis (Free Text):  Pre-op Diagnosis     LIPOMA OF BACK        Preop Diagnosis (Codes):    Post op Diagnosis  Lipoma      Premium Reason  A. 3PM - 7AM    Comments:                                                                  Excision of lipoma back, prone,  Premium time

## 2020-08-12 NOTE — ANESTHESIA PREPROCEDURE EVALUATION
Relevant Problems   No relevant active problems       Physical Exam    Airway   Mallampati: II  TM distance: >3 FB  Neck ROM: full       Cardiovascular - normal exam  Rhythm: regular  Rate: normal  (-) murmur     Dental - normal exam           Pulmonary - normal exam  Breath sounds clear to auscultation     Abdominal    Neurological - normal exam                 Anesthesia Plan

## 2020-08-12 NOTE — DISCHARGE INSTRUCTIONS
ACTIVITY: Rest and take it easy for the first 24 hours.  A responsible adult is recommended to remain with you during that time.  It is normal to feel sleepy.  We encourage you to not do anything that requires balance, judgment or coordination.    MILD FLU-LIKE SYMPTOMS ARE NORMAL. YOU MAY EXPERIENCE GENERALIZED MUSCLE ACHES, THROAT IRRITATION, HEADACHE AND/OR SOME NAUSEA.    FOR 24 HOURS DO NOT:  Drive, operate machinery or run household appliances.  Drink beer or alcoholic beverages.   Make important decisions or sign legal documents.    SPECIAL INSTRUCTIONS: D/C instructions:    1. DIET: Upon discharge from the hospital you may resume your normal preoperative diet. Depending on how you are feeling and whether you have nausea or not, you may wish to stay with a bland diet for the first few days. However, you can advance this as quickly as you feel ready.    2. ACTIVITIES: After discharge from the hospital, you may resume full routine activities. However, there should be no heavy lifting (greater than 15 pounds) and no strenuous activities until after your follow-up visit. Otherwise, routine activities of daily living are acceptable.    3. DRIVING: You may drive whenever you are off pain medications and are able to perform the activities needed to drive, i.e. turning, bending, twisting, etc.    4. BATHING: You may get the wound wet at any time after leaving the hospital. You may shower, but do not submerge in a bath for at least a week. Dressings may come off after 48 hours. Leave steri strips on until they fall off.  It may be necessary to trim the edges.     5. BOWEL FUNCTION: Constipation is common after an operation, especially with pain medications. The combination of pain medication and decreased activity level can cause constipation in otherwise normal patients. If you feel this is occurring, take a laxative (Milk of Magnesia, Ex-Lax, Senokot, etc.) until the problem has resolved.    6. PAIN MEDICATION:  You will be given a prescription for pain medication at discharge. Please take these as directed. It is important to remember not to take medications on an empty stomach as this may cause nausea.    7.CALL IF YOU HAVE: (1) Fevers to more than 1010 F, (2) Unusual chest or leg pain, (3) Drainage or fluid from incision that may be foul smelling, increased tenderness or soreness at the wound or the wound edges are no longer together, redness or swelling at the incision site. Please do not hesitate to call with any other questions.     8. APPOINTMENT: Contact our office at [phone] for a follow-up appointment in 2 weeks following your procedure.    If you have any additional questions, please do not hesitate to call the office and speak to either myself or the physician on call.    Office address:  [address] [place] [address] [id number]  Juve Jensen MD  [facility]  [phone]      You should CALL YOUR PHYSICIAN if you develop:  Fever greater than 101 degrees F.  Pain not relieved by medication, or persistent nausea or vomiting.  Excessive bleeding (blood soaking through dressing) or unexpected drainage from the wound.  Extreme redness or swelling around the incision site, drainage of pus or foul smelling drainage.  Inability to urinate or empty your bladder within 8 hours.  Problems with breathing or chest pain.    You should call [phone] if you develop problems with breathing or chest pain.  If you are unable to contact your doctor or surgical center, you should go to the nearest emergency room or urgent care center.  Physician's telephone #: [phone] Dr Jensen    If any questions arise, call your doctor.  If your doctor is not available, please feel free to call the Surgical Center at [phone].  The Center is open Monday through Friday from 7AM to 7PM.  You can also call the [facility] HOTLINE open 24 hours/day, 7 days/week and speak to a nurse at [phone], or toll free at [phone].    A registered  nurse may call you a few days after your surgery to see how you are doing after your procedure.    MEDICATIONS: Resume taking daily medication.  Take prescribed pain medication with food.  If no medication is prescribed, you may take non-aspirin pain medication if needed.  PAIN MEDICATION CAN BE VERY CONSTIPATING.  Take a stool softener or laxative such as senokot, pericolace, or milk of magnesia if needed.    Prescription given for Norco for pain.  Last pain medication given at No oral pain medications given during recovery.    If your physician has prescribed pain medication that includes Acetaminophen (Tylenol), do not take additional Acetaminophen (Tylenol) while taking the prescribed medication.    Depression / Suicide Risk    As you are discharged from this [facility] facility, it is important to learn how to keep safe from harming yourself.    Recognize the warning signs:  · Abrupt changes in personality, positive or negative- including increase in energy   · Giving away possessions  · Change in eating patterns- significant weight changes-  positive or negative  · Change in sleeping patterns- unable to sleep or sleeping all the time   · Unwillingness or inability to communicate  · Depression  · Unusual sadness, discouragement and loneliness  · Talk of wanting to die  · Neglect of personal appearance   · Rebelliousness- reckless behavior  · Withdrawal from people/activities they love  · Confusion- inability to concentrate     If you or a loved one observes any of these behaviors or has concerns about self-harm, here's what you can do:  · Talk about it- your feelings and reasons for harming yourself  · Remove any means that you might use to hurt yourself (examples: pills, rope, extension cords, firearm)  · Get professional help from the community (Mental Health, Substance Abuse, psychological counseling)  · Do not be alone:Call your Safe Contact- someone whom you trust who will be there for you.  · Call your  local CRISIS HOTLINE 719-2662 or 753-817-2341  · Call your local Children's Mobile Crisis Response Team [place] (056) 043-8202 or www.[employer]  · Call the toll free National Suicide Prevention Hotlines   · National Suicide Prevention Lifeline 441-675-SOUA (4556)  · National [employer] Line Network 800-SUICIDE (996-8054)

## 2020-08-12 NOTE — ANESTHESIA PREPROCEDURE EVALUATION
Relevant Problems   No relevant active problems       Physical Exam    Anesthesia Plan    ASA 1       Plan - general       Airway plan will be ETT        Induction: intravenous    Postoperative Plan: Postoperative administration of opioids is intended.    Pertinent diagnostic labs and testing reviewed    Informed Consent:    Anesthetic plan and risks discussed with patient.    Use of blood products discussed with: patient whom consented to blood products.

## 2020-08-12 NOTE — ANESTHESIA PROCEDURE NOTES
Airway    Date/Time: 8/12/2020 3:37 PM  Performed by: Aba Karimi M.D.  Authorized by: Aba Karimi M.D.     Location:  OR  Urgency:  Elective  Indications for Airway Management:  Anesthesia      Spontaneous Ventilation: absent    Sedation Level:  Deep  Preoxygenated: Yes    Patient Position:  Sniffing  Final Airway Type:  Endotracheal airway  Final Endotracheal Airway:  ETT  Cuffed: Yes    Technique Used for Successful ETT Placement:  Direct laryngoscopy    Insertion Site:  Oral  Blade Type:  Esdras  Laryngoscope Blade/Videolaryngoscope Blade Size:  3  ETT Size (mm):  7.0  Measured from:  Teeth  ETT to Teeth (cm):  21  Placement Verified by: auscultation and capnometry    Cormack-Lehane Classification:  Grade I - full view of glottis  Number of Attempts at Approach:  1

## 2020-08-13 NOTE — OR NURSING
@1657 Pt arrived to Phase 2. Pt has no complaints of pain or nausea. Incision site noted to be clean dry and intact. Pts Sister called back to discharge room. Pts belongings returned to patient. Pt mobilized from gurney to chair. Pts vital signs are stable. Pt assisted with getting dressed by Pts sister.     @1713 Discharge instructions discussed with patient at bedside. Pt verbalizes understanding. Pt ambulated to bathroom, Pt able to void without issue.    @1720 PIV removed. Pt discharged to home with Pts sister and all belongings and prescriptions.

## 2020-10-05 ENCOUNTER — IMMUNIZATION (OUTPATIENT)
Dept: OCCUPATIONAL MEDICINE | Facility: CLINIC | Age: 35
End: 2020-10-05

## 2020-10-05 DIAGNOSIS — Z23 NEED FOR VACCINATION: ICD-10-CM

## 2020-10-05 PROCEDURE — 90686 IIV4 VACC NO PRSV 0.5 ML IM: CPT | Performed by: NURSE PRACTITIONER

## 2020-12-18 DIAGNOSIS — Z23 NEED FOR VACCINATION: ICD-10-CM

## 2020-12-21 ENCOUNTER — APPOINTMENT (OUTPATIENT)
Dept: FAMILY PLANNING/WOMEN'S HEALTH CLINIC | Facility: IMMUNIZATION CENTER | Age: 35
End: 2020-12-21
Attending: FAMILY MEDICINE
Payer: COMMERCIAL

## 2020-12-21 DIAGNOSIS — Z23 NEED FOR VACCINATION: ICD-10-CM

## 2020-12-21 PROCEDURE — 0001A PFIZER SARS-COV-2 VACCINE: CPT

## 2020-12-21 PROCEDURE — 91300 PFIZER SARS-COV-2 VACCINE: CPT

## 2020-12-22 ENCOUNTER — IMMUNIZATION (OUTPATIENT)
Dept: FAMILY PLANNING/WOMEN'S HEALTH CLINIC | Facility: IMMUNIZATION CENTER | Age: 35
End: 2020-12-22

## 2020-12-22 DIAGNOSIS — Z23 ENCOUNTER FOR VACCINATION: Primary | ICD-10-CM

## 2021-01-11 ENCOUNTER — IMMUNIZATION (OUTPATIENT)
Dept: FAMILY PLANNING/WOMEN'S HEALTH CLINIC | Facility: IMMUNIZATION CENTER | Age: 36
End: 2021-01-11
Attending: FAMILY MEDICINE
Payer: COMMERCIAL

## 2021-01-11 DIAGNOSIS — Z23 ENCOUNTER FOR VACCINATION: Primary | ICD-10-CM

## 2021-01-11 PROCEDURE — 91300 PFIZER SARS-COV-2 VACCINE: CPT

## 2021-01-11 PROCEDURE — 0002A PFIZER SARS-COV-2 VACCINE: CPT

## 2021-01-13 ENCOUNTER — APPOINTMENT (OUTPATIENT)
Dept: DERMATOLOGY | Facility: IMAGING CENTER | Age: 36
End: 2021-01-13
Payer: COMMERCIAL

## 2021-01-14 ENCOUNTER — OFFICE VISIT (OUTPATIENT)
Dept: DERMATOLOGY | Facility: IMAGING CENTER | Age: 36
End: 2021-01-14
Payer: COMMERCIAL

## 2021-01-14 ENCOUNTER — HOSPITAL ENCOUNTER (OUTPATIENT)
Facility: MEDICAL CENTER | Age: 36
End: 2021-01-14
Attending: DERMATOLOGY
Payer: COMMERCIAL

## 2021-01-14 VITALS — WEIGHT: 128 LBS | TEMPERATURE: 97.5 F | BODY MASS INDEX: 20.57 KG/M2 | HEIGHT: 66 IN

## 2021-01-14 DIAGNOSIS — D22.9 NEVUS: ICD-10-CM

## 2021-01-14 DIAGNOSIS — D49.2 NEOPLASM OF SKIN: ICD-10-CM

## 2021-01-14 DIAGNOSIS — L81.4 LENTIGO: ICD-10-CM

## 2021-01-14 DIAGNOSIS — Z86.018 HISTORY OF LIPOMA: ICD-10-CM

## 2021-01-14 DIAGNOSIS — L90.5 SCAR: ICD-10-CM

## 2021-01-14 DIAGNOSIS — L90.8 SKIN AGING: ICD-10-CM

## 2021-01-14 DIAGNOSIS — Z12.83 SKIN CANCER SCREENING: ICD-10-CM

## 2021-01-14 PROCEDURE — 88305 TISSUE EXAM BY PATHOLOGIST: CPT

## 2021-01-14 PROCEDURE — 99203 OFFICE O/P NEW LOW 30 MIN: CPT | Mod: 25 | Performed by: DERMATOLOGY

## 2021-01-14 PROCEDURE — 11102 TANGNTL BX SKIN SINGLE LES: CPT | Performed by: DERMATOLOGY

## 2021-01-14 NOTE — PROGRESS NOTES
CC: ERIN    Subjective: New patient here for ERIN. Last ERIN was in 2017.    HPI/location: right mid back  Time present: 3-4 years  Painful lesion: No  Itching lesion: No  Enlarging lesion: Yes   Anything make it better or worse? No  Desires removal    History of skin cancer: No  History of precancers/actinic keratoses: No  History of biopsies:Yes, Details: 2 moles removed, benign .  Hx lipoma excision  History of blistering/severe sunburns:No  Family history of skin cancer:Yes, Details: Father BCC  Family history of atypical moles:No      ROS: no fevers/chills. No itch.  No cough  DermPMH: no skin cancer/melanoma  No problem-specific Assessment & Plan notes found for this encounter.    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  Skin: Scalp/face/eyes/lips/neck/chest/back/arms/legs/hands/feet/buttocks - without suspicious lesions noted.  Genitals exam declined  -approx 5mm verrucous papule on upper right back, inferior to excision scar, healed  -scattered hyperpigmented macules/papules appearing on torso/extremities, appearing benign without suspicious features  -scars, well healed on left leg and upper right back/shoudler    Path - lipoma - back Juve Jensen excision    A/P: Neoplasm NOS: back; traumatized nevus vs wart  -consent for bx, including R/B/A. Cleaned with EtOH, anesthesia with lidocaine 1% + epinephrine, shave bx, AlCl3 for hemostasis  -vaseline/bandage and wound care reviewed    Nevi: benign appearing:  -Reviewed ABCDEs  -Reviewed skin cancer detection/prevention  -RTC PRN growth/changes/concerning features    Lentigos: benign  -reassurance  -reviewed skin cancer detection/prevention  -counseled on cosmetic treatment PRN    Scars, well healed on upper back and left leg.  No evidence of lipoma recurrence      I have reviewed medications relevant to my specialty.    F/u Q1-2 years/PRN

## 2021-01-15 LAB — PATHOLOGY CONSULT NOTE: NORMAL

## 2021-06-21 DIAGNOSIS — Z30.011 ENCOUNTER FOR INITIAL PRESCRIPTION OF CONTRACEPTIVE PILLS: ICD-10-CM

## 2021-06-22 RX ORDER — NORGESTIMATE AND ETHINYL ESTRADIOL 7DAYSX3 LO
KIT ORAL
Qty: 84 TABLET | Refills: 0 | Status: SHIPPED | OUTPATIENT
Start: 2021-06-22 | End: 2021-10-11

## 2021-06-30 ENCOUNTER — HOSPITAL ENCOUNTER (OUTPATIENT)
Facility: MEDICAL CENTER | Age: 36
End: 2021-06-30
Attending: OBSTETRICS & GYNECOLOGY
Payer: COMMERCIAL

## 2021-06-30 PROCEDURE — 82306 VITAMIN D 25 HYDROXY: CPT

## 2021-06-30 PROCEDURE — 84443 ASSAY THYROID STIM HORMONE: CPT

## 2021-06-30 PROCEDURE — 80053 COMPREHEN METABOLIC PANEL: CPT

## 2021-06-30 PROCEDURE — 85025 COMPLETE CBC W/AUTO DIFF WBC: CPT

## 2021-07-01 LAB
25(OH)D3 SERPL-MCNC: 70 NG/ML (ref 30–100)
ALBUMIN SERPL BCP-MCNC: 4.4 G/DL (ref 3.2–4.9)
ALBUMIN/GLOB SERPL: 1.5 G/DL
ALP SERPL-CCNC: 40 U/L (ref 30–99)
ALT SERPL-CCNC: 15 U/L (ref 2–50)
ANION GAP SERPL CALC-SCNC: 9 MMOL/L (ref 7–16)
AST SERPL-CCNC: 17 U/L (ref 12–45)
BASOPHILS # BLD AUTO: 0.5 % (ref 0–1.8)
BASOPHILS # BLD: 0.04 K/UL (ref 0–0.12)
BILIRUB SERPL-MCNC: 0.2 MG/DL (ref 0.1–1.5)
BUN SERPL-MCNC: 20 MG/DL (ref 8–22)
CALCIUM SERPL-MCNC: 9.8 MG/DL (ref 8.5–10.5)
CHLORIDE SERPL-SCNC: 103 MMOL/L (ref 96–112)
CO2 SERPL-SCNC: 24 MMOL/L (ref 20–33)
CREAT SERPL-MCNC: 0.93 MG/DL (ref 0.5–1.4)
EOSINOPHIL # BLD AUTO: 0.17 K/UL (ref 0–0.51)
EOSINOPHIL NFR BLD: 2.2 % (ref 0–6.9)
ERYTHROCYTE [DISTWIDTH] IN BLOOD BY AUTOMATED COUNT: 41.2 FL (ref 35.9–50)
GLOBULIN SER CALC-MCNC: 2.9 G/DL (ref 1.9–3.5)
GLUCOSE SERPL-MCNC: 84 MG/DL (ref 65–99)
HCT VFR BLD AUTO: 43.5 % (ref 37–47)
HGB BLD-MCNC: 13.8 G/DL (ref 12–16)
IMM GRANULOCYTES # BLD AUTO: 0.02 K/UL (ref 0–0.11)
IMM GRANULOCYTES NFR BLD AUTO: 0.3 % (ref 0–0.9)
LYMPHOCYTES # BLD AUTO: 2.22 K/UL (ref 1–4.8)
LYMPHOCYTES NFR BLD: 28.8 % (ref 22–41)
MCH RBC QN AUTO: 28.8 PG (ref 27–33)
MCHC RBC AUTO-ENTMCNC: 31.7 G/DL (ref 33.6–35)
MCV RBC AUTO: 90.6 FL (ref 81.4–97.8)
MONOCYTES # BLD AUTO: 0.55 K/UL (ref 0–0.85)
MONOCYTES NFR BLD AUTO: 7.1 % (ref 0–13.4)
NEUTROPHILS # BLD AUTO: 4.71 K/UL (ref 2–7.15)
NEUTROPHILS NFR BLD: 61.1 % (ref 44–72)
NRBC # BLD AUTO: 0 K/UL
NRBC BLD-RTO: 0 /100 WBC
PLATELET # BLD AUTO: 283 K/UL (ref 164–446)
PMV BLD AUTO: 10.6 FL (ref 9–12.9)
POTASSIUM SERPL-SCNC: 4.1 MMOL/L (ref 3.6–5.5)
PROT SERPL-MCNC: 7.3 G/DL (ref 6–8.2)
RBC # BLD AUTO: 4.8 M/UL (ref 4.2–5.4)
SODIUM SERPL-SCNC: 136 MMOL/L (ref 135–145)
TSH SERPL DL<=0.005 MIU/L-ACNC: 4.59 UIU/ML (ref 0.38–5.33)
WBC # BLD AUTO: 7.7 K/UL (ref 4.8–10.8)

## 2021-07-14 ENCOUNTER — HOSPITAL ENCOUNTER (OUTPATIENT)
Dept: LAB | Facility: MEDICAL CENTER | Age: 36
End: 2021-07-14
Attending: NURSE PRACTITIONER
Payer: COMMERCIAL

## 2021-07-14 ENCOUNTER — OFFICE VISIT (OUTPATIENT)
Dept: MEDICAL GROUP | Facility: IMAGING CENTER | Age: 36
End: 2021-07-14
Payer: COMMERCIAL

## 2021-07-14 VITALS
WEIGHT: 130.4 LBS | OXYGEN SATURATION: 99 % | SYSTOLIC BLOOD PRESSURE: 116 MMHG | DIASTOLIC BLOOD PRESSURE: 80 MMHG | HEART RATE: 89 BPM | TEMPERATURE: 97.9 F | RESPIRATION RATE: 14 BRPM | HEIGHT: 66 IN | BODY MASS INDEX: 20.96 KG/M2

## 2021-07-14 DIAGNOSIS — R79.89 ABNORMAL CBC: ICD-10-CM

## 2021-07-14 DIAGNOSIS — R10.10 PAIN OF UPPER ABDOMEN: ICD-10-CM

## 2021-07-14 PROCEDURE — 83550 IRON BINDING TEST: CPT

## 2021-07-14 PROCEDURE — 36415 COLL VENOUS BLD VENIPUNCTURE: CPT

## 2021-07-14 PROCEDURE — 83540 ASSAY OF IRON: CPT

## 2021-07-14 PROCEDURE — 82728 ASSAY OF FERRITIN: CPT

## 2021-07-14 PROCEDURE — 83013 H PYLORI (C-13) BREATH: CPT

## 2021-07-14 PROCEDURE — 99213 OFFICE O/P EST LOW 20 MIN: CPT | Performed by: NURSE PRACTITIONER

## 2021-07-14 PROCEDURE — 82607 VITAMIN B-12: CPT

## 2021-07-14 RX ORDER — OMEPRAZOLE 20 MG/1
20 CAPSULE, DELAYED RELEASE ORAL DAILY
Qty: 90 CAPSULE | Refills: 0 | Status: SHIPPED | OUTPATIENT
Start: 2021-07-14

## 2021-07-14 ASSESSMENT — FIBROSIS 4 INDEX: FIB4 SCORE: 0.53

## 2021-07-14 ASSESSMENT — PAIN SCALES - GENERAL: PAINLEVEL: 1=MINIMAL PAIN

## 2021-07-14 NOTE — PATIENT INSTRUCTIONS
Increase fiber to a daily total of 25-35 grams as tolerated for overall health benefit.  Keep a 7-day log of average fiber intake before increasing fiber. You can increase your fiber by supplementing your fiber intake with over-the-counter psyllium husk as tolerated. Increase fiber by 2 grams every 5 days as tolerated. As you increase your fiber you may experience possible GI upset, increase as tolerated and go slow.

## 2021-07-14 NOTE — PROGRESS NOTES
Subjective:   CC: Abdominal Pain (x 1 year, especially after eating, middle to RUQ ) and Bloated    HPI:   Divina is a 36-year-old female patient of Dr. Dolores Helm who presents today to discuss:    States she has been experiencing ongoing upper abdomen pain with radiation to right upper right quadrant pain. States that she has been experiencing pain intermittently for 1 year. States that pain worsens after eating. States that she is full quickly when eating. States that she is experiencing bloating intermittently. States that she has ongoing fatigue. States that she is having mild nausea intermittently, but has vomited once due to pain in the past year. Denies any diarrhea and/or constipation. States that her job is very stressful and she has not been eating a well-balanced diet. Reports that she notices fried food and highly processed food makes pain worse. States in the past she has tried a probiotic with no improvement. States that she has increased her alcohol intake during 2020 due to stress of job and ongoing COVID-19 pandemic. States that she drinks 1 cup of coffee a day. Denies any smoking. Denies any bleeding with bowel movements, black tarry stool, and/or any changes to bowel movement. Reports that her mother has history of hypothyroidism.    6/30/2021 TSH: 4.59    Lab Results   Component Value Date/Time    WBC 7.7 06/30/2021 03:50 PM    RBC 4.80 06/30/2021 03:50 PM    HEMOGLOBIN 13.8 06/30/2021 03:50 PM    HEMATOCRIT 43.5 06/30/2021 03:50 PM    MCV 90.6 06/30/2021 03:50 PM    MCH 28.8 06/30/2021 03:50 PM    MCHC 31.7 (L) 06/30/2021 03:50 PM    MPV 10.6 06/30/2021 03:50 PM    NEUTSPOLYS 61.10 06/30/2021 03:50 PM    LYMPHOCYTES 28.80 06/30/2021 03:50 PM    MONOCYTES 7.10 06/30/2021 03:50 PM    EOSINOPHILS 2.20 06/30/2021 03:50 PM    BASOPHILS 0.50 06/30/2021 03:50 PM      History reviewed. No pertinent past medical history.    Social History     Tobacco Use   • Smoking status: Never Smoker   •  "Smokeless tobacco: Never Used   Vaping Use   • Vaping Use: Never used   Substance Use Topics   • Alcohol use: Yes     Comment: 1 per week   • Drug use: No     Current Outpatient Medications Ordered in Epic   Medication Sig Dispense Refill   • omeprazole (PRILOSEC) 20 MG delayed-release capsule Take 1 capsule by mouth every day. 90 capsule 0   • TRI-LO-MYAH 0.18/0.215/0.25 MG-25 MCG Tab Take 1 tablet by mouth once daily 84 tablet 0   • Ascorbic Acid (VITAMIN C PO) Take  by mouth every day. (Patient not taking: Reported on 7/14/2021)     • therapeutic multivitamin-minerals (THERAGRAN-M) Tab Take 1 Tab by mouth every day. (Patient not taking: Reported on 7/14/2021)       No current [facility]-ordered facility-administered medications on file.     Allergies:  Patient has no known allergies.    ROS:  Constitutional: Denies fever, chills, night sweats, weight loss/gain and/or malaise. Fatigue, see HPI.   HENT: Denies nasal congestion, sore throat, hearing loss, enlarged thyroid, or difficulty swallowing.    Respiratory: Denies cough, SOB at rest or activity.    Cardiovascular: Denies tachycardia, chest pain, palpitations, or  leg swelling.   Gastrointestinal: Denies C/D, loss appetite, reflux, or hematochezia. Abdomen pain, bloating, nausea, vomiting, see HPI.  Genitourinary: Denies difficulty voiding, dysuria, nocturia, or hematuria.   Skin: Negative for rash or worrisome moles.   Neurological: Negative for dizziness, focal weakness and headaches.   Psychiatric/Behavioral: Denies depression, nervous/anxious, difficulty sleeping.     Objective:   Exam:  /80 (BP Location: Left arm, Patient Position: Sitting, BP Cuff Size: Small adult)   Pulse 89   Temp 36.6 °C (97.9 °F) (Temporal)   Resp 14   Ht 1.676 m (5' 6\")   Wt 59.1 kg (130 lb 6.4 oz)   LMP 07/07/2021   SpO2 99%   BMI 21.05 kg/m²  Body mass index is 21.05 kg/m².  General: Normal appearing. No distress.  HEENT: Normocephalic. Eyes conjunctiva clear lids without " ptosis, PERRLA, ears normal shape and contour, canals are clear bilaterally, tympanic membranes pearly gray, intact, non-bulging, no drainage noted. Oral and nasal examine deferred due to current COVID-19 outbreak, no acute concerns. Patient wore a mask during visit.  Neck: Supple without JVD or abnormal masses. Small soft mobile thyroid palpated, no nodules palpated, non-tender.  Pulmonary: Clear to ausculation.  Normal effort. No rales, ronchi, or wheezing.  Cardiovascular: Regular rate and rhythm without murmur. Pedal and radial pulses are intact and equal bilaterally.  Abdomen: Soft, mildly tender to light palpation in epigastric and right upper quadrant. Nondistended. Normal bowel sounds. Liver and spleen are not palpable  Neurologic: Grossly non-focal.  Lymph: No cervical or supraclavicular lymph nodes are palpable  Skin: Warm and dry. No obvious lesions.  Musculoskeletal: Normal gait. No extremity cyanosis, clubbing, or edema. DTR+2. Muscle strength 5/5.  Psych: Normal mood and affect. Alert and oriented x3. Judgment and insight is normal.    Assessment & Plan:   1. Pain of upper abdomen  This is a chronic stable condition.  Discussed physical assessment findings with patient, verbalized understanding. Discussed with patient completing an H. pylori test and fit test. Discussed with patient once she has completed H. pylori test starting omeprazole 20 mg in a.m. 30 minutes to 1 hour before first meal. Reviewed possible side effects with patient, verbalized understanding and willingness to start medication. Patient instructed to avoid large portions of food and to avoid any trigger food. Instructed to continue maintaining a low caffeine intake. Discussed with patient continuing to limit alcohol intake. Discussed increasing fiber to a daily total of 20-30 grams as tolerated for overall health benefit.  Discussed keeping a 7-day log of average fiber intake before increasing fiber. Discussed seeking fiber from her  daily food intake, discussed different foods that are higher in fiber. Discussed with patient as she increases her fiber she can supplement fiber intake with OTC psyllium husk as tolerated, verbalized understanding. Instructed to increase fiber by 2 grams every 5 days as tolerated. Discussed possible GI upset as she increases her fiber, verbalized understanding. If pain is not improved with omeprazole and diet change will consider abdominal ultrasound to further evaluate gallbladder function. We will also repeat TSH possibly in the future due to borderline subclinical hypothyroidism.    - H. PYLORI BREATH TEST  - omeprazole (PRILOSEC) 20 MG delayed-release capsule; Take 1 capsule by mouth every day.  Dispense: 90 capsule; Refill: 0  - OCCULT BLOOD FECES IMMUNOASSAY; Future    2. Abnormal CBC  Will evaluate plan of care once labs are obtained.    - IRON/TOTAL IRON BIND; Future  - FERRITIN; Future  - VITAMIN B12; Future      Return in about 4 weeks (around 8/11/2021).    Please note that this dictation was created using voice recognition software. I have made every reasonable attempt to correct obvious errors, but I expect that there are errors of grammar and possibly content that I did not discover before finalizing the note.

## 2021-07-15 LAB
FERRITIN SERPL-MCNC: 72.5 NG/ML (ref 10–291)
IRON SATN MFR SERPL: 54 % (ref 15–55)
IRON SERPL-MCNC: 165 UG/DL (ref 40–170)
TIBC SERPL-MCNC: 304 UG/DL (ref 250–450)
UIBC SERPL-MCNC: 139 UG/DL (ref 110–370)
VIT B12 SERPL-MCNC: 678 PG/ML (ref 211–911)

## 2021-07-16 LAB — UREA BREATH TEST QL: NEGATIVE

## 2021-07-28 ENCOUNTER — HOSPITAL ENCOUNTER (OUTPATIENT)
Dept: RADIOLOGY | Facility: MEDICAL CENTER | Age: 36
End: 2021-07-28
Attending: OBSTETRICS & GYNECOLOGY
Payer: COMMERCIAL

## 2021-07-28 DIAGNOSIS — R10.11 ABDOMINAL PAIN, RIGHT UPPER QUADRANT: ICD-10-CM

## 2021-07-28 PROCEDURE — 76705 ECHO EXAM OF ABDOMEN: CPT

## 2021-08-11 ENCOUNTER — HOSPITAL ENCOUNTER (OUTPATIENT)
Facility: MEDICAL CENTER | Age: 36
End: 2021-08-11
Attending: NURSE PRACTITIONER
Payer: COMMERCIAL

## 2021-08-11 ENCOUNTER — OFFICE VISIT (OUTPATIENT)
Dept: MEDICAL GROUP | Facility: IMAGING CENTER | Age: 36
End: 2021-08-11
Payer: COMMERCIAL

## 2021-08-11 VITALS
HEIGHT: 66 IN | SYSTOLIC BLOOD PRESSURE: 108 MMHG | BODY MASS INDEX: 21.24 KG/M2 | HEART RATE: 85 BPM | OXYGEN SATURATION: 98 % | RESPIRATION RATE: 14 BRPM | TEMPERATURE: 98.1 F | WEIGHT: 132.2 LBS | DIASTOLIC BLOOD PRESSURE: 60 MMHG

## 2021-08-11 DIAGNOSIS — R10.9 FLANK PAIN: ICD-10-CM

## 2021-08-11 DIAGNOSIS — R79.89 ELEVATED TSH: ICD-10-CM

## 2021-08-11 DIAGNOSIS — R10.10 PAIN OF UPPER ABDOMEN: ICD-10-CM

## 2021-08-11 DIAGNOSIS — Z87.39: ICD-10-CM

## 2021-08-11 PROCEDURE — 82274 ASSAY TEST FOR BLOOD FECAL: CPT

## 2021-08-11 PROCEDURE — 99213 OFFICE O/P EST LOW 20 MIN: CPT | Performed by: NURSE PRACTITIONER

## 2021-08-11 ASSESSMENT — PATIENT HEALTH QUESTIONNAIRE - PHQ9
5. POOR APPETITE OR OVEREATING: 0 - NOT AT ALL
SUM OF ALL RESPONSES TO PHQ QUESTIONS 1-9: 4
CLINICAL INTERPRETATION OF PHQ2 SCORE: 1

## 2021-08-11 ASSESSMENT — PAIN SCALES - GENERAL: PAINLEVEL: NO PAIN

## 2021-08-11 ASSESSMENT — FIBROSIS 4 INDEX: FIB4 SCORE: 0.53

## 2021-08-11 NOTE — PROGRESS NOTES
Subjective:   CC: Flank Pain (right side ) and Results (ultrasound )    HPI:   Divina is a 36-year-old female patient of Dr. Dolores Helm who presents today to discuss:    Denies any concerns at this time.  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     Depression Screening  Little interest or pleasure in doing things?  0 - not at all   Feeling down, depressed , or hopeless? 1 - several days   Trouble falling or staying asleep, or sleeping too much?  1 - several days   Feeling tired or having little energy?  1 - several days   Poor appetite or overeating?  0 - not at all   Feeling bad about yourself - or that you are a failure or have let yourself or your family down? 1 - several days   Trouble concentrating on things, such as reading the newspaper or watching television? 0 - not at all   Moving or speaking so slowly that other people could have noticed.  Or the opposite - being so fidgety or restless that you have been moving around a lot more than usual?  0 - not at all   Thoughts that you would be better off dead, or of hurting yourself?  0 - not at all   Patient Health Questionnaire Score: 4     If depressive symptoms identified deferred to follow up visit unless specifically addressed in assesment and plan.    Interpretation of PHQ-9 Total Score   Score Severity   1-4 No Depression   5-9 Mild Depression   10-14 Moderate Depression   15-19 Moderately Severe Depression   20-27 Severe Depression    Reports after last visit she was seen by OB/GYN.  States during visit she was discussed with OB her right upper quadrant pain.  States that OB Dr. Dar Pepper ordered a right upper quadrant ultrasound at that time.  States that she has been told overall ultrasound is normal.  Reports that she has been taking omeprazole 20 mg daily in a.m.  States that she feels that overall pain has decreased, but she is continuing to have similar bloating symptoms.   States that she is experiencing right intermittent flank pain.  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  Denies urinary tract infection symptoms at this time and/or fever, chills, and/or body aches.      7/28/2021 12:57 PM     HISTORY/REASON FOR EXAM:  Right upper quadrant pain after eating. Early satiety. Bloating.        TECHNIQUE/EXAM DESCRIPTION AND NUMBER OF VIEWS:  Real-time sonography of the liver and biliary tree.     COMPARISON: None     FINDINGS:  The liver is normal in contour. There is no evidence of solid mass lesion. The liver measures 16.94 cm.     The gallbladder is normal. There is no evidence of cholelithiasis.  The gallbladder wall thickness measures 0.20 cm. There is no pericholecystic fluid.  The common duct measures 0.71 cm.     The visualized pancreas is unremarkable.  The visualized aorta is normal in caliber.     Intrahepatic IVC is patent.     The portal vein is patent with hepatopetal flow. The MPV measures 1.06 cm cm.     The right kidney measures 9.74 cm.     There is no ascites.     IMPRESSION:  1.  Mildly dilated common bile duct of uncertain etiology. This could be related to the timing of the patient's last meal.  2.  Exam is otherwise unremarkable.    7/14/2021 H. pylori test: Negative  6/30/2021 TSH: 4.59    History reviewed. No pertinent past medical history.    Social History     Tobacco Use   • Smoking status: Never Smoker   • Smokeless tobacco: Never Used   Vaping Use   • Vaping Use: Never used   Substance Use Topics   • Alcohol use: Yes     Comment: 1 per week   • Drug use: No     Current Outpatient Medications Ordered in Epic   Medication Sig Dispense Refill   • omeprazole (PRILOSEC) 20 MG delayed-release capsule Take 1 capsule by mouth every day. 90 capsule 0   • TRI-LO-MYAH 0.18/0.215/0.25 MG-25 MCG Tab Take 1 tablet by mouth once daily  "84 tablet 0   • Ascorbic Acid (VITAMIN C PO) Take  by mouth every day. (Patient not taking: Reported on 7/14/2021)     • therapeutic multivitamin-minerals (THERAGRAN-M) Tab Take 1 Tab by mouth every day. (Patient not taking: Reported on 7/14/2021)       No current [facility]-ordered facility-administered medications on file.     Allergies:  Patient has no known allergies.    ROS:  Constitutional: Denies fever, chills, night sweats, weight loss/gain and/or malaise. Fatigue, see HPI.   HENT: Denies nasal congestion, sore throat, hearing loss, enlarged thyroid, or difficulty swallowing.    Respiratory: Denies cough, SOB at rest or activity.    Cardiovascular: Denies tachycardia, chest pain, palpitations, or  leg swelling.   Gastrointestinal: Denies N/VC/D, loss appetite, reflux, or hematochezia. Abdomen pain and bloating, see HPI.  Genitourinary: Denies difficulty voiding, dysuria, nocturia, or hematuria.   Skin: Negative for rash or worrisome moles.   Neurological: Negative for dizziness, focal weakness and headaches.   Psychiatric/Behavioral: Denies depression, nervous/anxious, difficulty sleeping.     Objective:   Exam:  /60 (BP Location: Left arm, Patient Position: Sitting, BP Cuff Size: Small adult)   Pulse 85   Temp 36.7 °C (98.1 °F) (Temporal)   Resp 14   Ht 1.676 m (5' 6\")   Wt 60 kg (132 lb 3.2 oz)   LMP 08/03/2021   SpO2 98%   BMI 21.34 kg/m²  Body mass index is 21.34 kg/m².  General: Normal appearing. No distress.  HEENT: Normocephalic. Eyes conjunctiva clear lids without ptosis, PERRLA, ears normal shape and contour. Oral, ears, and nasal examine deferred due to current COVID-19 outbreak no acute concerns. Patient wore a mask during visit.  Neck:Trachea midline, no masses, no thyromegaly.  Pulmonary:  Unlabored respiratory effort, no cough.  Neurologic: Grossly non-focal.  Skin: Warm and dry.  No obvious lesions.  Musculoskeletal: Normal gait. No extremity cyanosis, clubbing, or edema.   Psych: " Normal mood and affect. Alert and oriented x3. Judgment and insight is normal.    Assessment & Plan:   1. Elevated TSH  2. Flank pain  3. Pain of upper abdomen  This is a chronic condition.  Instructed to increase omeprazole to 40 mg in a.m. before first meal. Aware of possible side effects.  Discussed completing renal ultrasound and HIDA scan to rule out renal involvement and gallbladder involvement.  Discussed with patient obtaining thyroid panel due to noted elevated TSH recently, verbalized understanding and willingness.  Discussed possible benefits of probiotics with patient instructed to explore starting.  Discussed with patient if labs are normal and imaging is negative will place referral to GI for further evaluation and management, verbalized understanding and willingness.    - TRIIDOTHYRONINE; Future  - ANTITHYROGLOBULIN AB; Future  - THYROID PEROXIDASE  (TPO) AB; Future  - TSH; Future  - FREE THYROXINE; Future  - US-RENAL; Future  - NM-BILIARY (HIDA) SCAN WITH CCK; Future    4. History of bone disorder  This is a chronic stable condition.  Discussed a referral to orthopedics for further evaluation and management, verbalized understanding willingness to participate in referral.  Discussed with patient that overall stress could be contributing to upper quadrant pain, verbalized understanding.  Discussed with patient the benefit of worklife balance, verbalized understanding.    - REFERRAL TO ORTHOPEDICS    Return for Pending results..    Please note that this dictation was created using voice recognition software. I have made every reasonable attempt to correct obvious errors, but I expect that there are errors of grammar and possibly content that I did not discover before finalizing the note.

## 2021-08-13 DIAGNOSIS — R10.10 PAIN OF UPPER ABDOMEN: ICD-10-CM

## 2021-08-14 PROBLEM — Z87.39 HISTORY OF BONE DISORDER: Status: ACTIVE | Noted: 2021-08-14

## 2021-08-15 LAB — IMM ASSAY OCC BLD FITOB: NEGATIVE

## 2021-08-20 ENCOUNTER — APPOINTMENT (OUTPATIENT)
Dept: RADIOLOGY | Facility: MEDICAL CENTER | Age: 36
End: 2021-08-20
Attending: NURSE PRACTITIONER
Payer: COMMERCIAL

## 2021-08-24 ENCOUNTER — HOSPITAL ENCOUNTER (OUTPATIENT)
Dept: LAB | Facility: MEDICAL CENTER | Age: 36
End: 2021-08-24
Attending: NURSE PRACTITIONER
Payer: COMMERCIAL

## 2021-08-24 DIAGNOSIS — R79.89 ELEVATED TSH: ICD-10-CM

## 2021-08-24 LAB
T3 SERPL-MCNC: 131 NG/DL (ref 60–181)
T4 FREE SERPL-MCNC: 1.1 NG/DL (ref 0.93–1.7)
TSH SERPL DL<=0.005 MIU/L-ACNC: 5.05 UIU/ML (ref 0.38–5.33)

## 2021-08-24 PROCEDURE — 36415 COLL VENOUS BLD VENIPUNCTURE: CPT

## 2021-08-24 PROCEDURE — 86800 THYROGLOBULIN ANTIBODY: CPT

## 2021-08-24 PROCEDURE — 84443 ASSAY THYROID STIM HORMONE: CPT

## 2021-08-24 PROCEDURE — 84439 ASSAY OF FREE THYROXINE: CPT

## 2021-08-24 PROCEDURE — 86376 MICROSOMAL ANTIBODY EACH: CPT

## 2021-08-24 PROCEDURE — 84480 ASSAY TRIIODOTHYRONINE (T3): CPT

## 2021-08-26 ENCOUNTER — TELEMEDICINE (OUTPATIENT)
Dept: MEDICAL GROUP | Facility: IMAGING CENTER | Age: 36
End: 2021-08-26
Payer: COMMERCIAL

## 2021-08-26 VITALS — BODY MASS INDEX: 20.73 KG/M2 | HEART RATE: 78 BPM | WEIGHT: 129 LBS | HEIGHT: 66 IN

## 2021-08-26 DIAGNOSIS — R10.11 CHRONIC RIGHT UPPER QUADRANT PAIN: ICD-10-CM

## 2021-08-26 DIAGNOSIS — R53.83 FATIGUE, UNSPECIFIED TYPE: ICD-10-CM

## 2021-08-26 DIAGNOSIS — G89.29 CHRONIC RIGHT UPPER QUADRANT PAIN: ICD-10-CM

## 2021-08-26 DIAGNOSIS — E03.8 SUBCLINICAL HYPOTHYROIDISM: ICD-10-CM

## 2021-08-26 LAB
THYROGLOB AB SERPL-ACNC: <0.9 IU/ML (ref 0–4)
THYROPEROXIDASE AB SERPL-ACNC: 1.5 IU/ML (ref 0–9)

## 2021-08-26 PROCEDURE — 99213 OFFICE O/P EST LOW 20 MIN: CPT | Mod: 95 | Performed by: NURSE PRACTITIONER

## 2021-08-26 RX ORDER — CELECOXIB 200 MG/1
CAPSULE ORAL
COMMUNITY
Start: 2021-06-21 | End: 2021-10-11

## 2021-08-26 ASSESSMENT — PAIN SCALES - GENERAL: PAINLEVEL: NO PAIN

## 2021-08-26 ASSESSMENT — FIBROSIS 4 INDEX: FIB4 SCORE: 0.53

## 2021-08-26 NOTE — PROGRESS NOTES
Virtual Visit: Established Patient   This visit was conducted via Zoom using secure and encrypted videoconferencing technology.   The patient was in a private location in the state of [place].    The patient's identity was confirmed and verbal consent was obtained for this virtual visit.  Patient is aware that this is a billable encounter.  Subjective:   CC:   Chief Complaint   Patient presents with   • Lab Results     Divina Hassan is a 36 y.o. female patient of Dr. Dolores Helm who is presenting for evaluation and management of:    Reports that she has both HIDA scan and renal ultrasound scheduled for September.  States that she does continue to have intermittent ongoing upper right quadrant pain.  Denies any fever, chills, body aches, and/or constant worsening pain.  Denies any nausea and vomiting.  States that she does know that her mother has a history of hypothyroidism and is on 25 mcg of levothyroxine daily.  Reports that she has an appointment with her OB/GYN to switch her birth control from oral birth control to possibly an IUD.  She is wondering if ongoing symptoms have anything to relate to her birth control choice.  States that she is also wondering if her TSH could be elevated due to current birth control.  Reports that she would like to have birth control changed before starting thyroid medication, but is open to starting thyroid medication if needed.  Reports that she has ongoing fatigue and is uncertain if this is just due to work stressors.  States that she commonly has afternoon fatigue.    8/24/2021 TSH: 5.05, free T4: 1.1, T3: 131, antithyroglobulin less than 0.9, TPO AB: 1.5    ROS   Constitutional: Denies fever, chills, night sweats, weight loss/gain and/or malaise. Fatigue, see HPI.   HENT: Denies nasal congestion, sore throat, hearing loss, enlarged thyroid, or difficulty swallowing.    Respiratory: Denies cough, SOB at rest or activity.    Cardiovascular: Denies tachycardia, chest  "pain, palpitations, or  leg swelling.   Gastrointestinal: Denies N/VC/D, loss appetite, reflux, or hematochezia. Abdomen pain and bloating, see HPI.  Genitourinary: Denies difficulty voiding, dysuria, nocturia, or hematuria.   Skin: Negative for rash or worrisome moles.   Neurological: Negative for dizziness, focal weakness and headaches.   Psychiatric/Behavioral: Denies depression, nervous/anxious, difficulty sleeping.     Current medicines (including changes today)  Current Outpatient Medications   Medication Sig Dispense Refill   • celecoxib (CELEBREX) 200 MG Cap      • omeprazole (PRILOSEC) 20 MG delayed-release capsule Take 1 capsule by mouth every day. 90 capsule 0   • TRI-LO-MYAH 0.18/0.215/0.25 MG-25 MCG Tab Take 1 tablet by mouth once daily 84 tablet 0     No current facility-administered medications for this visit.     Patient Active Problem List    Diagnosis Date Noted   • History of bone disorder 08/14/2021      Objective:   Pulse 78   Ht 1.676 m (5' 6\")   Wt 58.5 kg (129 lb)   LMP 08/03/2021   BMI 20.82 kg/m²   Respiratory rate observed during visit: 14 bpm    Physical Exam:  Constitutional: Alert, no distress, well-groomed.  Skin: No rashes in visible areas.  Eye: Round. Conjunctiva clear, lids normal. No icterus.   ENMT: Lips pink without lesions, good dentition, moist mucous membranes. Phonation normal.  Neck: No masses, no thyromegaly. Moves freely without pain.  Respiratory: Unlabored respiratory effort, no cough or audible wheeze  Psych: Alert and oriented x3, normal affect and mood.     Assessment and Plan:   1. Chronic right upper quadrant pain  2. Fatigue, unspecified type  3. Subclinical hypothyroidism  This is a chronic stable condition.  Instructed to complete scheduled imaging.  Once imaging is obtained will reevaluate plan of care.  Discussed recent labs with patient suggesting that she has ongoing subclinical hypothyroidism that could be causing ongoing symptoms, verbalized " understanding.  Discussed plan of care of switching birth control to see if overall symptoms are improved with switching to progesterone only birth control.  Will reevaluate plan of care in 8 weeks and/or after having IUD placed. Instructed to notify covering provider if she experiences worse upper quadrant pain will reevaluate plan of care sooner than later.  Discussed with patient possibly starting levothyroxine 25 mcg daily in a.m. before first meal 30 minutes to 1 hour verbalized understanding and will consider at future time.  Reinforced patient avoiding alcohol, heavy fatty foods and/or rich foods due to possibly worsening right upper quadrant pain, verbalized understanding and willingness.  Discussed with patient that she is possibly experiencing fatigue due to daily stressors at work and/or possibly related to subclinical hypothyroidism, verbalized understanding.    Follow-up: Return in about 8 weeks (around 10/21/2021).

## 2021-09-09 ENCOUNTER — HOSPITAL ENCOUNTER (OUTPATIENT)
Dept: RADIOLOGY | Facility: MEDICAL CENTER | Age: 36
End: 2021-09-09
Attending: NURSE PRACTITIONER
Payer: COMMERCIAL

## 2021-09-09 DIAGNOSIS — R10.10 PAIN OF UPPER ABDOMEN: ICD-10-CM

## 2021-09-09 DIAGNOSIS — R10.9 FLANK PAIN: ICD-10-CM

## 2021-09-09 PROCEDURE — A9537 TC99M MEBROFENIN: HCPCS

## 2021-10-01 ENCOUNTER — IMMUNIZATION (OUTPATIENT)
Dept: OCCUPATIONAL MEDICINE | Facility: CLINIC | Age: 36
End: 2021-10-01

## 2021-10-01 DIAGNOSIS — Z23 NEED FOR VACCINATION: Primary | ICD-10-CM

## 2021-10-01 PROCEDURE — 90686 IIV4 VACC NO PRSV 0.5 ML IM: CPT | Performed by: NURSE PRACTITIONER

## 2021-10-11 ENCOUNTER — TELEMEDICINE (OUTPATIENT)
Dept: MEDICAL GROUP | Facility: IMAGING CENTER | Age: 36
End: 2021-10-11
Payer: COMMERCIAL

## 2021-10-11 ENCOUNTER — HOSPITAL ENCOUNTER (OUTPATIENT)
Dept: LAB | Facility: MEDICAL CENTER | Age: 36
End: 2021-10-11
Attending: NURSE PRACTITIONER
Payer: COMMERCIAL

## 2021-10-11 VITALS — BODY MASS INDEX: 20.82 KG/M2 | RESPIRATION RATE: 14 BRPM | HEIGHT: 66 IN | HEART RATE: 77 BPM

## 2021-10-11 DIAGNOSIS — E03.8 SUBCLINICAL HYPOTHYROIDISM: ICD-10-CM

## 2021-10-11 LAB — TSH SERPL DL<=0.005 MIU/L-ACNC: 3.07 UIU/ML (ref 0.38–5.33)

## 2021-10-11 PROCEDURE — 99213 OFFICE O/P EST LOW 20 MIN: CPT | Mod: 95 | Performed by: NURSE PRACTITIONER

## 2021-10-11 PROCEDURE — 84443 ASSAY THYROID STIM HORMONE: CPT

## 2021-10-11 PROCEDURE — 36415 COLL VENOUS BLD VENIPUNCTURE: CPT

## 2021-10-11 RX ORDER — LEVONORGESTREL 19.5 MG/1
1 INTRAUTERINE DEVICE INTRAUTERINE ONCE
COMMUNITY

## 2021-10-11 ASSESSMENT — PAIN SCALES - GENERAL: PAINLEVEL: NO PAIN

## 2021-10-11 NOTE — PROGRESS NOTES
Virtual Visit: Established Patient   This visit was conducted via Zoom using secure and encrypted videoconferencing technology.   The patient was in a private location in the state of [place].    The patient's identity was confirmed and verbal consent was obtained for this virtual visit.  Patient is aware that this is a billable encounter.  Subjective:   CC:   Chief Complaint   Patient presents with   • Follow-Up     6-8 week follow up   • Lab Results     TSH levels - off of birth control pill 2 months, has IUD     Divina Hassan is a 36 y.o. female presenting for evaluation and management of:    Reports that she has made lifestyle modifications to her diet.  Reports that she is eating a vegetarian diet.  Reports that she is not eating or drinking daily.  Reports that she is eating minimal gluten.  Denies avoiding soy.  States that she is drinking goat Kefir milk for overall GI health.  Reports that she continues to have intermittent right upper quadrant and abdomen pain, but has improved with change in diet.  States that she has also had Kyleena IUD placed and has stopped birth control.  Reports that this time she would like to repeat her TSH level before considering level thyroxine.    8/24/2021:    TSH: 5.05, T4: 1.1, T3: 131, TPO AB: 1.5, antithyroid globulin antibodies: 0.9    ROS   Constitutional: Denies fever, chills, night sweats, weight loss/gain and/or malaise. Fatigue, see HPI.   HENT: Denies nasal congestion, sore throat, hearing loss, enlarged thyroid, or difficulty swallowing.    Respiratory: Denies cough, SOB at rest or activity.    Cardiovascular: Denies tachycardia, chest pain, palpitations, or  leg swelling.   Gastrointestinal: Denies N/VC/D, loss appetite, reflux, or hematochezia. Abdomen pain and bloating, see HPI.  Genitourinary: Denies difficulty voiding, dysuria, nocturia, or hematuria.   Skin: Negative for rash or worrisome moles.   Neurological: Negative for dizziness, focal weakness and  "headaches.   Psychiatric/Behavioral: Denies depression, nervous/anxious, difficulty sleeping.     Current medicines (including changes today)  Current Outpatient Medications   Medication Sig Dispense Refill   • levonorgestrel (KYLEENA) 19.5 mg (17.5 mcg/24 hr) IUD 1 Each by Intrauterine route one time.     • omeprazole (PRILOSEC) 20 MG delayed-release capsule Take 1 capsule by mouth every day. 90 capsule 0     No current facility-administered medications for this visit.     Patient Active Problem List    Diagnosis Date Noted   • History of bone disorder 08/14/2021      Objective:   Pulse 77   Resp 14   Ht 1.676 m (5' 6\")   LMP 09/30/2021 Comment: got IUD same day as period  BMI 20.82 kg/m²   Respiratory rate observed during visit:14 bpm    Physical Exam:  Constitutional: Alert, no distress, well-groomed.  Skin: No rashes in visible areas.  Eye: Round. Conjunctiva clear, lids normal. No icterus.   ENMT: Lips pink without lesions, good dentition, moist mucous membranes. Phonation normal.  Neck: No masses, no thyromegaly. Moves freely without pain.  Respiratory: Unlabored respiratory effort, no cough or audible wheeze  Psych: Alert and oriented x3, normal affect and mood.     Assessment and Plan:   1. Subclinical hypothyroidism  This is a stable condition.  Discussed with patient completing TSH at this time prior to possibly starting levothyroxine 25 mcg daily.  Discussed with patient if continues to be in subclinical level and/or higher will consider giving levothyroxine in a.m. 25 mcg before first meal.  It would be advised the patient has repeat TSH if started on medication 4 to 6 weeks after starting, verbalized understanding and willingness.  Patient encouraged to continue lifestyle modifications.  Discussed with patient possibly lowering overall soy intake due to being a common known inflammatory food, verbalized understanding.    - TSH WITH REFLEX TO FT4; Future    Follow-up: Return for Pending lab " results.

## 2022-02-14 ENCOUNTER — PHARMACY VISIT (OUTPATIENT)
Dept: PHARMACY | Facility: MEDICAL CENTER | Age: 37
End: 2022-02-14
Payer: COMMERCIAL

## 2022-02-14 PROCEDURE — RXMED WILLOW AMBULATORY MEDICATION CHARGE: Performed by: INTERNAL MEDICINE

## 2022-02-14 RX ORDER — RNA INGREDIENT BNT-162B2 0.23 G/1.8ML
0.3 INJECTION, SUSPENSION INTRAMUSCULAR
Qty: 0.3 ML | Refills: 0 | OUTPATIENT
Start: 2022-02-14

## 2022-03-02 ENCOUNTER — OFFICE VISIT (OUTPATIENT)
Dept: DERMATOLOGY | Facility: IMAGING CENTER | Age: 37
End: 2022-03-02
Payer: COMMERCIAL

## 2022-03-02 DIAGNOSIS — B07.9 VERRUCAS: ICD-10-CM

## 2022-03-02 PROCEDURE — 99213 OFFICE O/P EST LOW 20 MIN: CPT | Performed by: NURSE PRACTITIONER

## 2022-03-02 NOTE — PROGRESS NOTES
DERMATOLOGY NOTE  FOLLOW UP VISIT       Chief complaint: Follow-Up         HPI:  Skin lesion   Location: nose   Time present: 8 months   Painful lesion: No  Itching lesion: No  Enlarging lesion: Yes  Anything make it better or worse?no       History of skin cancer: No  History of precancers/actinic keratoses: No  History of biopsies:Yes, Details: 2 moles removed, benign .  Hx lipoma excision  History of blistering/severe sunburns:No  Family history of skin cancer:Yes, Details: Father BCC  Family history of atypical moles:No       No Known Allergies     MEDICATIONS:  Medications relevant to specialty reviewed.     REVIEW OF SYSTEMS:   Positive for skin (see HPI)  Negative for fevers and chills       EXAM:  There were no vitals taken for this visit.  Constitutional: Well-developed, well-nourished, and in no distress.     A focused skin exam was performed including the affected areas of the face. Notable findings on exam today listed below and/or in assessment/plan.     2 mm verrucous like papule to L nasolabial fold    IMPRESSION / PLAN:    1. Verrucas  - Benign-appearing nature of lesions discussed. Advised to return to clinic for any new or concerning changes.  Courtesy [id number]  Applied, aftercare discussed      Discussed risks, benefits, alternative treatments as well as common side effects associated with prescribed treatment, Patient verbalized understanding and agrees with plan regarding the above        I have performed a physical exam and reviewed and updated ROS and Plan today (3/2/2022). In review of dermatology visit (1/14/2021), there are no changes except as documented above.          Please note that this dictation was created using voice recognition software. I have made every reasonable attempt to correct obvious errors, but I expect that there are errors of grammar and possibly content that I did not discover before finalizing the note.      Return to clinic in: No follow-ups on file. and as needed for  any new or changing skin lesions.

## 2022-07-28 ENCOUNTER — APPOINTMENT (OUTPATIENT)
Dept: BEHAVIORAL HEALTH | Facility: CLINIC | Age: 37
End: 2022-07-28
Payer: COMMERCIAL

## 2022-10-27 ENCOUNTER — PHARMACY VISIT (OUTPATIENT)
Dept: PHARMACY | Facility: MEDICAL CENTER | Age: 37
End: 2022-10-27
Payer: COMMERCIAL

## 2022-10-27 PROCEDURE — RXMED WILLOW AMBULATORY MEDICATION CHARGE: Performed by: INTERNAL MEDICINE

## 2022-10-27 RX ORDER — INFLUENZA A VIRUS A/BRISBANE/02/2018 IVR-190 (H1N1) ANTIGEN (FORMALDEHYDE INACTIVATED), INFLUENZA A VIRUS A/KANSAS/14/2017 X-327 (H3N2) ANTIGEN (FORMALDEHYDE INACTIVATED), INFLUENZA B VIRUS B/PHUKET/3073/2013 ANTIGEN (FORMALDEHYDE INACTIVATED), AND INFLUENZA B VIRUS B/MARYLAND/15/2016 BX-69A ANTIGEN (FORMALDEHYDE INACTIVATED) 15; 15; 15; 15 UG/.5ML; UG/.5ML; UG/.5ML; UG/.5ML
0.5 INJECTION, SUSPENSION INTRAMUSCULAR
Qty: 0.5 ML | Refills: 0 | OUTPATIENT
Start: 2022-10-27 | End: 2022-10-28

## 2023-02-21 ENCOUNTER — APPOINTMENT (OUTPATIENT)
Dept: MEDICAL GROUP | Facility: IMAGING CENTER | Age: 38
End: 2023-02-21
Payer: COMMERCIAL

## 2023-02-22 ENCOUNTER — OFFICE VISIT (OUTPATIENT)
Dept: MEDICAL GROUP | Facility: IMAGING CENTER | Age: 38
End: 2023-02-22
Payer: COMMERCIAL

## 2023-02-22 VITALS
HEART RATE: 67 BPM | OXYGEN SATURATION: 100 % | SYSTOLIC BLOOD PRESSURE: 108 MMHG | DIASTOLIC BLOOD PRESSURE: 70 MMHG | HEIGHT: 66 IN | WEIGHT: 137 LBS | RESPIRATION RATE: 16 BRPM | BODY MASS INDEX: 22.02 KG/M2 | TEMPERATURE: 97.2 F

## 2023-02-22 DIAGNOSIS — Z76.89 ENCOUNTER TO ESTABLISH CARE: ICD-10-CM

## 2023-02-22 DIAGNOSIS — E03.8 SUBCLINICAL HYPOTHYROIDISM: ICD-10-CM

## 2023-02-22 PROCEDURE — 99213 OFFICE O/P EST LOW 20 MIN: CPT

## 2023-02-22 ASSESSMENT — PATIENT HEALTH QUESTIONNAIRE - PHQ9: CLINICAL INTERPRETATION OF PHQ2 SCORE: 0

## 2023-02-22 ASSESSMENT — FIBROSIS 4 INDEX: FIB4 SCORE: 0.56

## 2023-02-22 NOTE — PROGRESS NOTES
Chief Complaint   Patient presents with    Establish Care     thyroid       HISTORY OF THE PRESENT ILLNESS: Patient is a 38 y.o. female. This pleasant patient is here today to establish care and to discuss    To establish care  Previous pcp rachele ohm    Subclinical hypothyroidism  Established condition. Patient has not required pharmacotherapy for this. She has made lifestyle and dietary modifications to managed this.    She eats gluten free diet for the most part. Reports that she is eating a vegetarian diet. She exercises 5x a week. She reports that since IUD placement, her TSH has remained therapeutic.    TSH: 3.070on 10/11/2021  Denies Symptoms:   cold intolerance/heat intolerance  constipation  dry skin/hair loss  fatigue  lethargy  voice changes  weight gain/difficulty loosing weight  depression    She does have an [facility]. Her recent labs were done in December.   She currently has IUD in place. Tolerating well without side effects.  She is up to date on Pap smear with normal results.   She is up to date on STI screening.     Allergies: Patient has no known allergies.    Current Outpatient Medications Ordered in Epic   Medication Sig Dispense Refill    levonorgestrel (KYLEENA) 19.5 mg (17.5 mcg/24 hr) IUD 1 Each by Intrauterine route one time.      COVID-19 mRNA Vaccine, Pfizer, (PFIZER-Embanet COVID-19 VACC) 30 MCG/0.3ML Suspension injection Inject 0.3 mL into the shoulder, thigh, or buttocks. 0.3 mL 0    omeprazole (PRILOSEC) 20 MG delayed-release capsule Take 1 capsule by mouth every day. 90 capsule 0     No current Epic-ordered facility-administered medications on file.       History reviewed. No pertinent past medical history.    Past Surgical History:   Procedure Laterality Date    LIPOMA EXCISION Right 8/12/2020    Procedure: EXCISION, LIPOMA- UPPER BACK;  Surgeon: Juve Jensen M.D.;  Location: SURGERY [facility];  Service: General       Social History     Tobacco Use     "Smoking status: Never    Smokeless tobacco: Never   Vaping Use    Vaping Use: Never used   Substance Use Topics    Alcohol use: Yes     Comment: 1 per week    Drug use: No       Social History     Social History Narrative    Not on file       History reviewed. No pertinent family history.    ROS:     - Constitutional: Negative for fever, chills, unexpected weight change, and fatigue/generalized weakness.     - HEENT: Negative for headaches, vision changes, hearing changes, ear pain, ear discharge, rhinorrhea, sinus congestion, sore throat, and neck pain.      - Respiratory: Negative for cough, sputum production, chest congestion, dyspnea, wheezing, and crackles.      - Cardiovascular: Negative for chest pain, palpitations, orthopnea, and bilateral lower extremity edema.     - Gastrointestinal: Negative for heartburn, nausea, vomiting, abdominal pain, hematochezia, melena, diarrhea, constipation, and greasy/foul-smelling stools.     - Genitourinary: Negative for dysuria, polyuria, hematuria, pyuria, urinary urgency, and urinary incontinence.     - Musculoskeletal: Negative for myalgias, back pain, and joint pain.     - Skin: Negative for rash, itching, cyanotic skin color change.     - Neurological: Negative for dizziness, tingling, tremors, focal sensory deficit, focal weakness and headaches.     - Endo/Heme/Allergies: Does not bruise/bleed easily.     - Psychiatric/Behavioral: Negative for depression, suicidal/homicidal ideation and memory loss.        - NOTE: All other systems reviewed and are negative, except as in HPI.      Exam: /70 (BP Location: Left arm, Patient Position: Sitting, BP Cuff Size: Adult)   Pulse 67   Temp 36.2 °C (97.2 °F) (Temporal)   Resp 16   Ht 1.676 m (5' 6\")   Wt 62.1 kg (137 lb)   SpO2 100%  Body mass index is 22.11 kg/m².    General: Normal appearing. No distress.  HEENT: Normocephalic. Eyes conjunctiva clear lids without ptosis, ears normal shape and contour,nasal mucosa " benign, oropharynx is without erythema, edema or exudates.   Neck: Supple. Thyroid is not enlarged.  Pulmonary: Clear to ausculation.  Normal effort. No rales, ronchi, or wheezing.  Cardiovascular: Regular rate and rhythm without murmur. Carotid and radial pulses are intact and equal bilaterally.  Abdomen: Soft, nontender, nondistended. Normal bowel sounds.  Neurologic: Grossly nonfocal  Skin: Warm and dry.  No obvious lesions.  Musculoskeletal: Normal gait. No extremity cyanosis, clubbing, or edema.  Psych: Normal mood and affect. Alert and oriented x3. Judgment and insight is normal.    Please note that this dictation was created using voice recognition software. I have made every reasonable attempt to correct obvious errors, but I expect that there are errors of grammar and possibly content that I did not discover before finalizing the note.      Assessment/Plan    38 y.o. female with the following -    1. Encounter to establish care  Patient presents today to establish care.  Patient is overall healthy without past medical history.  She is up-to-date on her labs.  Will request medical records.  Recommend therapeutic lifestyle changes that include healthy diet that is rich in vegetables, fruits, fiber-rich whole grains, lean meats, poultry and fish, low in saturated and trans fats, cholesterol, sodium, and added sugars (DASH and Mediterranean diet).   Regular exercise at least 30 minutes 5 times a week.   Weight reduction if applicable (aim for 5% to 10% body weight increments).   Smoking cessation. Limit alcohol consumption.   Stress-reduction techniques such as meditation.   Practice good sleep hygiene.     2. Subclinical hypothyroidism  Established and stable condition with lifestyle and dietary modifications.  Patient's TSH level has been within therapeutic range consistently over the past few years.  Patient is asymptomatic.  Recommend to continue with current regimen.  Discussed that if she starts to  develop symptoms of hypothyroidism that we will recheck TSH level and consider starting levothyroxine if needed.  Patient agreeable to this.    Medical Decision Making/Course:  In the course of preparing for this visit with review of the pertinent past medical history, recent and past clinic visits, current medications, and performing chart, immunization, medical history and medication reconciliation, and in the further course of obtaining the current history pertinent to the clinic visit today, performing an exam and evaluation, ordering and independently evaluating labs, tests, and/or procedures, prescribing any recommended new medications as noted above, providing any pertinent counseling and education and recommending further coordination of care. This was discussed with patient in a shared-decision making conversation, and they understand and agreed with plan of care.     Return in about 1 year (around 2/22/2024), or if symptoms worsen or fail to improve, for annual.    Thank you, Gisele GRAY  [facility]    Please note that this dictation was created using voice recognition software. I have made every reasonable attempt to correct obvious errors, but I expect that there are errors of grammar and possibly content that I did not discover before finalizing the note.

## 2023-03-20 ENCOUNTER — HOSPITAL ENCOUNTER (OUTPATIENT)
Dept: LAB | Facility: MEDICAL CENTER | Age: 38
End: 2023-03-20
Payer: COMMERCIAL

## 2023-03-20 ENCOUNTER — OFFICE VISIT (OUTPATIENT)
Dept: MEDICAL GROUP | Facility: IMAGING CENTER | Age: 38
End: 2023-03-20
Payer: COMMERCIAL

## 2023-03-20 ENCOUNTER — HOSPITAL ENCOUNTER (OUTPATIENT)
Dept: RADIOLOGY | Facility: MEDICAL CENTER | Age: 38
End: 2023-03-20
Payer: COMMERCIAL

## 2023-03-20 VITALS
WEIGHT: 136 LBS | OXYGEN SATURATION: 100 % | BODY MASS INDEX: 21.86 KG/M2 | SYSTOLIC BLOOD PRESSURE: 102 MMHG | HEART RATE: 79 BPM | DIASTOLIC BLOOD PRESSURE: 78 MMHG | HEIGHT: 66 IN | TEMPERATURE: 97 F | RESPIRATION RATE: 16 BRPM

## 2023-03-20 DIAGNOSIS — Z00.00 WELLNESS EXAMINATION: ICD-10-CM

## 2023-03-20 DIAGNOSIS — E03.9 HYPOTHYROIDISM, UNSPECIFIED TYPE: ICD-10-CM

## 2023-03-20 DIAGNOSIS — Z13.220 SCREENING FOR CHOLESTEROL LEVEL: ICD-10-CM

## 2023-03-20 DIAGNOSIS — Z13.6 SCREENING FOR CARDIOVASCULAR CONDITION: ICD-10-CM

## 2023-03-20 DIAGNOSIS — Z13.1 DIABETES MELLITUS SCREENING: ICD-10-CM

## 2023-03-20 DIAGNOSIS — Z13.21 ENCOUNTER FOR VITAMIN DEFICIENCY SCREENING: ICD-10-CM

## 2023-03-20 DIAGNOSIS — R10.12 LEFT UPPER QUADRANT ABDOMINAL PAIN: ICD-10-CM

## 2023-03-20 LAB
25(OH)D3 SERPL-MCNC: 46 NG/ML (ref 30–100)
ALBUMIN SERPL BCP-MCNC: 4.9 G/DL (ref 3.2–4.9)
ALBUMIN/GLOB SERPL: 1.7 G/DL
ALP SERPL-CCNC: 54 U/L (ref 30–99)
ALT SERPL-CCNC: 12 U/L (ref 2–50)
AMYLASE SERPL-CCNC: 52 U/L (ref 20–103)
ANION GAP SERPL CALC-SCNC: 12 MMOL/L (ref 7–16)
APPEARANCE UR: NORMAL
AST SERPL-CCNC: 17 U/L (ref 12–45)
BASOPHILS # BLD AUTO: 0.9 % (ref 0–1.8)
BASOPHILS # BLD: 0.04 K/UL (ref 0–0.12)
BILIRUB SERPL-MCNC: 0.6 MG/DL (ref 0.1–1.5)
BILIRUB UR STRIP-MCNC: NORMAL MG/DL
BUN SERPL-MCNC: 11 MG/DL (ref 8–22)
CALCIUM ALBUM COR SERPL-MCNC: 9.1 MG/DL (ref 8.5–10.5)
CALCIUM SERPL-MCNC: 9.8 MG/DL (ref 8.5–10.5)
CHLORIDE SERPL-SCNC: 105 MMOL/L (ref 96–112)
CHOLEST SERPL-MCNC: 218 MG/DL (ref 100–199)
CO2 SERPL-SCNC: 21 MMOL/L (ref 20–33)
COLOR UR AUTO: NORMAL
CREAT SERPL-MCNC: 0.62 MG/DL (ref 0.5–1.4)
CRP SERPL HS-MCNC: <0.3 MG/DL (ref 0–0.75)
EOSINOPHIL # BLD AUTO: 0.17 K/UL (ref 0–0.51)
EOSINOPHIL NFR BLD: 3.6 % (ref 0–6.9)
ERYTHROCYTE [DISTWIDTH] IN BLOOD BY AUTOMATED COUNT: 40.3 FL (ref 35.9–50)
ERYTHROCYTE [SEDIMENTATION RATE] IN BLOOD BY WESTERGREN METHOD: 4 MM/HOUR (ref 0–25)
EST. AVERAGE GLUCOSE BLD GHB EST-MCNC: 94 MG/DL
GFR SERPLBLD CREATININE-BSD FMLA CKD-EPI: 118 ML/MIN/1.73 M 2
GGT SERPL-CCNC: 15 U/L (ref 7–34)
GLOBULIN SER CALC-MCNC: 2.9 G/DL (ref 1.9–3.5)
GLUCOSE SERPL-MCNC: 92 MG/DL (ref 65–99)
GLUCOSE UR STRIP.AUTO-MCNC: NORMAL MG/DL
HAV IGM SERPL QL IA: NORMAL
HBA1C MFR BLD: 4.9 % (ref 4–5.6)
HBV CORE IGM SER QL: NORMAL
HBV SURFACE AG SER QL: NORMAL
HCT VFR BLD AUTO: 46.5 % (ref 37–47)
HCV AB SER QL: NORMAL
HDLC SERPL-MCNC: 88 MG/DL
HGB BLD-MCNC: 15.3 G/DL (ref 12–16)
IMM GRANULOCYTES # BLD AUTO: 0.01 K/UL (ref 0–0.11)
IMM GRANULOCYTES NFR BLD AUTO: 0.2 % (ref 0–0.9)
KETONES UR STRIP.AUTO-MCNC: NORMAL MG/DL
LDLC SERPL CALC-MCNC: 120 MG/DL
LEUKOCYTE ESTERASE UR QL STRIP.AUTO: NORMAL
LIPASE SERPL-CCNC: 37 U/L (ref 11–82)
LYMPHOCYTES # BLD AUTO: 1.52 K/UL (ref 1–4.8)
LYMPHOCYTES NFR BLD: 32.3 % (ref 22–41)
MCH RBC QN AUTO: 28.9 PG (ref 27–33)
MCHC RBC AUTO-ENTMCNC: 32.9 G/DL (ref 33.6–35)
MCV RBC AUTO: 87.9 FL (ref 81.4–97.8)
MONOCYTES # BLD AUTO: 0.32 K/UL (ref 0–0.85)
MONOCYTES NFR BLD AUTO: 6.8 % (ref 0–13.4)
NEUTROPHILS # BLD AUTO: 2.64 K/UL (ref 2–7.15)
NEUTROPHILS NFR BLD: 56.2 % (ref 44–72)
NITRITE UR QL STRIP.AUTO: NORMAL
NRBC # BLD AUTO: 0 K/UL
NRBC BLD-RTO: 0 /100 WBC
PH UR STRIP.AUTO: 5.5 [PH] (ref 5–8)
PLATELET # BLD AUTO: 304 K/UL (ref 164–446)
PMV BLD AUTO: 9.9 FL (ref 9–12.9)
POTASSIUM SERPL-SCNC: 4 MMOL/L (ref 3.6–5.5)
PROT SERPL-MCNC: 7.8 G/DL (ref 6–8.2)
PROT UR QL STRIP: NORMAL MG/DL
RBC # BLD AUTO: 5.29 M/UL (ref 4.2–5.4)
RBC UR QL AUTO: NORMAL
SODIUM SERPL-SCNC: 138 MMOL/L (ref 135–145)
SP GR UR STRIP.AUTO: 1.02
TRIGL SERPL-MCNC: 51 MG/DL (ref 0–149)
TSH SERPL DL<=0.005 MIU/L-ACNC: 3 UIU/ML (ref 0.38–5.33)
UROBILINOGEN UR STRIP-MCNC: 0.2 MG/DL
WBC # BLD AUTO: 4.7 K/UL (ref 4.8–10.8)

## 2023-03-20 PROCEDURE — 83690 ASSAY OF LIPASE: CPT

## 2023-03-20 PROCEDURE — 36415 COLL VENOUS BLD VENIPUNCTURE: CPT

## 2023-03-20 PROCEDURE — 83036 HEMOGLOBIN GLYCOSYLATED A1C: CPT

## 2023-03-20 PROCEDURE — 81002 URINALYSIS NONAUTO W/O SCOPE: CPT

## 2023-03-20 PROCEDURE — 82306 VITAMIN D 25 HYDROXY: CPT

## 2023-03-20 PROCEDURE — 86664 EPSTEIN-BARR NUCLEAR ANTIGEN: CPT

## 2023-03-20 PROCEDURE — 84443 ASSAY THYROID STIM HORMONE: CPT

## 2023-03-20 PROCEDURE — 80074 ACUTE HEPATITIS PANEL: CPT

## 2023-03-20 PROCEDURE — 80061 LIPID PANEL: CPT

## 2023-03-20 PROCEDURE — 86140 C-REACTIVE PROTEIN: CPT

## 2023-03-20 PROCEDURE — 85652 RBC SED RATE AUTOMATED: CPT

## 2023-03-20 PROCEDURE — 82977 ASSAY OF GGT: CPT

## 2023-03-20 PROCEDURE — 80053 COMPREHEN METABOLIC PANEL: CPT

## 2023-03-20 PROCEDURE — 86663 EPSTEIN-BARR ANTIBODY: CPT

## 2023-03-20 PROCEDURE — 76700 US EXAM ABDOM COMPLETE: CPT

## 2023-03-20 PROCEDURE — 82150 ASSAY OF AMYLASE: CPT

## 2023-03-20 PROCEDURE — 99214 OFFICE O/P EST MOD 30 MIN: CPT

## 2023-03-20 PROCEDURE — 86665 EPSTEIN-BARR CAPSID VCA: CPT

## 2023-03-20 PROCEDURE — 85025 COMPLETE CBC W/AUTO DIFF WBC: CPT

## 2023-03-20 ASSESSMENT — FIBROSIS 4 INDEX: FIB4 SCORE: 0.56

## 2023-03-20 ASSESSMENT — PAIN SCALES - GENERAL: PAINLEVEL: 2=MINIMAL-SLIGHT

## 2023-03-20 NOTE — PROGRESS NOTES
"Subjective:     CC:   Chief Complaint   Patient presents with    GI Problem     Pt reported LUQ pain and goes through the back. Pressure fullness and uncomfortable       HPI:   Divina presents today to discuss:    LUQ abdominal pain  Patient reports developing left upper quadrant pain ~ 4 weeks ago. She describes progressively worsening \"pressure-like\" pain. Last week the pain radiated to left upper back.   She did have sore throat, headaches, body aches, and fatigue a week prior to her last office visit which was in February.  Those symptoms resolved; however, her left upper quadrant abdominal pressure developed.  She does have personal history of mono.  She admits to social etoh consumption.  She denies anorexia, early satiety, nausea, vomiting, dyspepsia, hematemesis, hematochezia, melena, or bowel changes.  She denies systemic symptoms of fevers, chills, lethargy, myalgias, or unintentional weight loss.  Denies pruritus or rash.  Denies risk of pregnancy. She has IUD in place.  Denies recent trauma or injury to affected site.    History reviewed. No pertinent past medical history.  History reviewed. No pertinent family history.  Past Surgical History:   Procedure Laterality Date    LIPOMA EXCISION Right 8/12/2020    Procedure: EXCISION, LIPOMA- UPPER BACK;  Surgeon: Juve Jensen M.D.;  Location: SURGERY [facility];  Service: General     Social History     Tobacco Use    Smoking status: Never    Smokeless tobacco: Never   Vaping Use    Vaping Use: Never used   Substance Use Topics    Alcohol use: Yes     Comment: 1 per week    Drug use: No     Social History     Social History Narrative    Not on file     Current Outpatient Medications Ordered in Epic   Medication Sig Dispense Refill    COVID-19 mRNA Vaccine, Pfizer, (PFIZER-[employer]il[employer] COVID-19 VACC) 30 MCG/0.3ML Suspension injection Inject 0.3 mL into the shoulder, thigh, or buttocks. (Patient not taking: Reported on 3/20/2023) 0.3 mL 0    levonorgestrel " "(KYLEENA) 19.5 mg (17.5 mcg/24 hr) IUD 1 Each by Intrauterine route one time. (Patient not taking: Reported on 3/20/2023)      omeprazole (PRILOSEC) 20 MG delayed-release capsule Take 1 capsule by mouth every day. 90 capsule 0     No current Epic-ordered facility-administered medications on file.     Patient has no known allergies.    ROS: see hpi  Gen: no fevers/chills  Pulm: no sob, no cough  CV: no chest pain, no palpitations, no edema  GI: no nausea/vomiting, no diarrhea  Skin: no rash    Objective:   Exam:  /78 (BP Location: Left arm, Patient Position: Sitting, BP Cuff Size: Adult)   Pulse 79   Temp 36.1 °C (97 °F) (Temporal)   Resp 16   Ht 1.676 m (5' 6\") Comment: pt reported  Wt 61.7 kg (136 lb)   SpO2 100%   BMI 21.95 kg/m²    Body mass index is 21.95 kg/m².    Gen: Alert and oriented, No apparent distress.  HEENT: Head atraumatic, normocephalic. Pupils equal and round.  Neck: Neck is supple without lymphadenopathy.   Lungs: Normal effort, CTA bilaterally, no wheezes, rhonchi, or rales  CV: Regular rate and rhythm. No murmurs, rubs, or gallops.  ABD: +BS. non-distended. No rebound, rigidity, or guarding. Spleen palpated and tender.   Psoas sign: negative  Obturator test: negative  Markle's sign \"heel jar test\": negative  Rovsing's signs: negative  Chamberlain's signs: negative  Grey-Chavez's sign: negative  Kehr's signs: negative  Burciaga's signs: negative  Ext: No clubbing, cyanosis, edema.    Assessment & Plan:     38 y.o. female with the following -     1. Left upper quadrant abdominal pain  Established and ongoing condition.  Patient presentation suspicious for splenomegaly from EBV?  Differentials include pancreatitis.  Vital signs are stable.  Patient is nontoxic or ill-appearing. No s/s hemodynamic compromise.  Urinalysis negative for UTI.  Will order abdominal ultrasound and lab to evaluation.  For worsening symptoms, advised patient to go to ED for immediate care.    - POCT Urinalysis  - CBC " WITH DIFFERENTIAL; Future  - Comp Metabolic Panel; Future  - EBV ACUTE INFECTION AB PANEL; Future  - CRP QUANTITIVE (NON-CARDIAC); Future  - Sed Rate; Future  - HEPATITIS PANEL ACUTE(4 COMPONENTS); Future  - LIPASE; Future  - AMYLASE; Future  - GAMMA GT (GGT); Future  - US-ABDOMEN COMPLETE SURVEY; Future    2. Wellness examination  Patient is due for routine labs. Will screen for anemia, thyroid disorder, metabolic disorder, cardiovascular disease, diabetes, and vitamin deficiency. Will order labs.    - CBC WITH DIFFERENTIAL; Future  - Comp Metabolic Panel; Future  - Lipid Profile; Future  - TSH WITH REFLEX TO FT4; Future  - VITAMIN D,25 HYDROXY (DEFICIENCY); Future  - HEMOGLOBIN A1C; Future    3. Hypothyroidism, unspecified type  Chronic and stable condition with lifestyle management.  Patient with subclinical hypothyroidism.  He is asymptomatic. TSH have been therapeutic.  Will order TSH level to reevaluate.    - TSH WITH REFLEX TO FT4; Future    4. Diabetes mellitus screening  - HEMOGLOBIN A1C; Future    5. Screening for cholesterol level  - Lipid Profile; Future    6. Screening for cardiovascular condition  - Comp Metabolic Panel; Future    7. Encounter for vitamin deficiency screening  - VITAMIN D,25 HYDROXY (DEFICIENCY); Future    Medical Decision Making/Course:  In the course of preparing for this visit with review of the pertinent past medical history, recent and past clinic visits, current medications, and performing chart, immunization, medical history and medication reconciliation, and in the further course of obtaining the current history pertinent to the clinic visit today, performing an exam and evaluation, ordering and independently evaluating labs, tests, and/or procedures, prescribing any recommended new medications as noted above, providing any pertinent counseling and education and recommending further coordination of care. This was discussed with patient in a shared-decision making conversation,  and they understand and agreed with plan of care.     Return if symptoms worsen or fail to improve.    YULIA Delgado.   [facility]    Please note that this dictation was created using voice recognition software. I have made every reasonable attempt to correct obvious errors, but I expect that there are errors of grammar and possibly content that I did not discover before finalizing the note.

## 2023-03-21 DIAGNOSIS — E78.00 PURE HYPERCHOLESTEROLEMIA: ICD-10-CM

## 2023-03-21 NOTE — PROGRESS NOTES
Discussed lab and abdomen ultrasound results. Patient overall is feeling better. She has had abd ultrasound and HIDA scan to evaluate her gallbladder which revealed similar findings.  She is not interested in surgical intervention at this point.  She will work on dietary changes.  Patient given strict instructions that if her symptoms worsen, will place referral to general surgery.  We will also place order for HIDA scan.  Patient agreeable to this.  Also recheck lipid panel in 3 months.

## 2023-03-22 LAB
EBV EA-D IGG SER-ACNC: <5 U/ML (ref 0–10.9)
EBV NA IGG SER IA-ACNC: <3 U/ML (ref 0–21.9)
EBV VCA IGG SER IA-ACNC: 487 U/ML (ref 0–21.9)
EBV VCA IGM SER IA-ACNC: 14.5 U/ML (ref 0–43.9)

## 2023-10-10 ENCOUNTER — IMMUNIZATION (OUTPATIENT)
Dept: OCCUPATIONAL MEDICINE | Facility: CLINIC | Age: 38
End: 2023-10-10

## 2023-10-10 DIAGNOSIS — Z23 NEED FOR VACCINATION: Primary | ICD-10-CM

## 2023-10-10 PROCEDURE — 90686 IIV4 VACC NO PRSV 0.5 ML IM: CPT | Performed by: PREVENTIVE MEDICINE

## 2023-10-16 ENCOUNTER — NON-PROVIDER VISIT (OUTPATIENT)
Dept: URGENT CARE | Facility: CLINIC | Age: 38
End: 2023-10-16

## 2023-10-16 DIAGNOSIS — Z11.1 SCREENING FOR TUBERCULOSIS: ICD-10-CM

## 2023-10-16 PROCEDURE — 86580 TB INTRADERMAL TEST: CPT | Performed by: NURSE PRACTITIONER

## 2023-10-18 ENCOUNTER — NON-PROVIDER VISIT (OUTPATIENT)
Dept: URGENT CARE | Facility: CLINIC | Age: 38
End: 2023-10-18
Payer: COMMERCIAL

## 2023-10-18 ENCOUNTER — HOSPITAL ENCOUNTER (OUTPATIENT)
Dept: LAB | Facility: MEDICAL CENTER | Age: 38
End: 2023-10-18
Payer: COMMERCIAL

## 2023-10-18 DIAGNOSIS — E78.00 PURE HYPERCHOLESTEROLEMIA: ICD-10-CM

## 2023-10-18 LAB
CHOLEST SERPL-MCNC: 182 MG/DL (ref 100–199)
FASTING STATUS PATIENT QL REPORTED: NORMAL
HDLC SERPL-MCNC: 89 MG/DL
LDLC SERPL CALC-MCNC: 84 MG/DL
TRIGL SERPL-MCNC: 46 MG/DL (ref 0–149)

## 2023-10-18 PROCEDURE — 80061 LIPID PANEL: CPT

## 2023-10-18 PROCEDURE — 36415 COLL VENOUS BLD VENIPUNCTURE: CPT

## 2023-10-23 ENCOUNTER — OFFICE VISIT (OUTPATIENT)
Dept: MEDICAL GROUP | Facility: IMAGING CENTER | Age: 38
End: 2023-10-23
Payer: COMMERCIAL

## 2023-10-23 VITALS
OXYGEN SATURATION: 99 % | RESPIRATION RATE: 14 BRPM | HEIGHT: 66 IN | SYSTOLIC BLOOD PRESSURE: 94 MMHG | TEMPERATURE: 97.1 F | DIASTOLIC BLOOD PRESSURE: 64 MMHG | WEIGHT: 130 LBS | HEART RATE: 65 BPM | BODY MASS INDEX: 20.89 KG/M2

## 2023-10-23 DIAGNOSIS — E78.00 PURE HYPERCHOLESTEROLEMIA: ICD-10-CM

## 2023-10-23 PROCEDURE — 3074F SYST BP LT 130 MM HG: CPT

## 2023-10-23 PROCEDURE — 99212 OFFICE O/P EST SF 10 MIN: CPT

## 2023-10-23 PROCEDURE — 3078F DIAST BP <80 MM HG: CPT

## 2023-10-23 ASSESSMENT — FIBROSIS 4 INDEX: FIB4 SCORE: 0.58

## 2023-10-23 NOTE — PROGRESS NOTES
Subjective:     CC:   Chief Complaint   Patient presents with    Follow-Up     lab       HPI:   Divina presents today to discuss:  She will be moving to [facility] in a month or so.   She will be requesting a  letter for her 40 lbs dog.    Pure hypercholesterolemia  Patient admits to dietary changes. She is eating more plant based, with occasional fish and chicken.   She exercises regularly.   Chronic and stable condition.  Blood pressure well controlled.  Refill sent to pharmacy.  Discussed medication desired effects, potential side effects, and how to administer medication.  Nonpharmacological interventions such as low-salt, cardiac diet discussed.  Educated on stress reduction and physical activity.  Discussed signs and symptoms of major cardiovascular event and need to present to ED.     Latest Reference Range & Units 03/20/23 09:08 10/18/23 08:42   Cholesterol,Tot 100 - 199 mg/dL 218 (H) 182   Triglycerides 0 - 149 mg/dL 51 46   HDL >=40 mg/dL 88 89   LDL <100 mg/dL 120 (H) 84   (H): Data is abnormally high    History reviewed. No pertinent past medical history.  History reviewed. No pertinent family history.  Past Surgical History:   Procedure Laterality Date    LIPOMA EXCISION Right 8/12/2020    Procedure: EXCISION, LIPOMA- UPPER BACK;  Surgeon: Juve Jensen M.D.;  Location: SURGERY [facility];  Service: General     Social History     Tobacco Use    Smoking status: Never    Smokeless tobacco: Never   Vaping Use    Vaping Use: Never used   Substance Use Topics    Alcohol use: Yes     Comment: 1 per week    Drug use: No     Social History     Social History Narrative    Not on file     Current Outpatient Medications Ordered in Epic   Medication Sig Dispense Refill    COVID-19 mRNA Vaccine, Pfizer, (PFIZER-[employer] C[employer] COVID-19 VACC) 30 MCG/0.3ML Suspension injection Inject 0.3 mL into the shoulder, thigh, or buttocks. (Patient not taking: Reported on 3/20/2023) 0.3 mL 0    levonorgestrel  "(KYLEENA) 19.5 mg (17.5 mcg/24 hr) IUD 1 Each by Intrauterine route one time. (Patient not taking: Reported on 3/20/2023)      omeprazole (PRILOSEC) 20 MG delayed-release capsule Take 1 capsule by mouth every day. 90 capsule 0     No current Epic-ordered facility-administered medications on file.     Patient has no known allergies.    ROS: see hpi  Gen: no fevers/chills  Pulm: no sob, no cough  CV: no chest pain, no palpitations, no edema  GI: no nausea/vomiting, no diarrhea  Skin: no rash    Objective:   Exam:  BP 94/64 (BP Location: Left arm, Patient Position: Sitting, BP Cuff Size: Adult)   Pulse 65   Temp 36.2 °C (97.1 °F) (Temporal)   Resp 14   Ht 1.676 m (5' 6\")   Wt 59 kg (130 lb)   SpO2 99%   BMI 20.98 kg/m²    Body mass index is 20.98 kg/m².    Gen: Alert and oriented, No apparent distress.  HEENT: Head atraumatic, normocephalic. Pupils equal and round.  Neck: Neck is supple without lymphadenopathy.   Lungs: Normal effort, CTA bilaterally, no wheezes, rhonchi, or rales  CV: Regular rate and rhythm. No murmurs, rubs, or gallops.  ABD: +BS. Non-tender, non-distended. No rebound, rigidity, or guarding.  Ext: No clubbing, cyanosis, edema.    Assessment & Plan:     38 y.o. female with the following -     1. Pure hypercholesterolemia  Established, resolved with lifestyle changes. She eats a plant based with occasional fish and chicken diet and exercises regularly. Recommend to continue with lifestyle modification.   Follow up as needed.   ED symptoms discussed.    Medical Decision Making/Course:  In the course of preparing for this visit with review of the pertinent past medical history, recent and past clinic visits, current medications, and performing chart, immunization, medical history and medication reconciliation, and in the further course of obtaining the current history pertinent to the clinic visit today, performing an exam and evaluation, ordering and independently evaluating labs, tests, and/or " procedures, prescribing any recommended new medications as noted above, providing any pertinent counseling and education and recommending further coordination of care. This was discussed with patient in a shared-decision making conversation, and they understand and agreed with plan of care.     Return if symptoms worsen or fail to improve.    YULIA Delgado.   [facility]    Please note that this dictation was created using voice recognition software. I have made every reasonable attempt to correct obvious errors, but I expect that there are errors of grammar and possibly content that I did not discover before finalizing the note.

## (undated) DEVICE — SENSOR SPO2 NEO LNCS ADHESIVE (20/BX) SEE USER NOTES

## (undated) DEVICE — MASK ANESTHESIA ADULT  - (100/CA)

## (undated) DEVICE — SLEEVE, VASO, THIGH, MED

## (undated) DEVICE — PACK MINOR BASIN - (2EA/CA)

## (undated) DEVICE — CANISTER SUCTION 3000ML MECHANICAL FILTER AUTO SHUTOFF MEDI-VAC NONSTERILE LF DISP  (40EA/CA)

## (undated) DEVICE — TUBING CLEARLINK DUO-VENT - C-FLO (48EA/CA)

## (undated) DEVICE — ELECTRODE DUAL RETURN W/ CORD - (50/PK)

## (undated) DEVICE — PROTECTOR ULNA NERVE - (36PR/CA)

## (undated) DEVICE — SUTURE GENERAL

## (undated) DEVICE — SET LEADWIRE 5 LEAD BEDSIDE DISPOSABLE ECG (1SET OF 5/EA)

## (undated) DEVICE — GLOVE BIOGEL SZ 6 PF LATEX - (50EA/BX 4BX/CA)

## (undated) DEVICE — DRESSING TRANSPARENT FILM TEGADERM 4 X 4.75" (50EA/BX)"

## (undated) DEVICE — SPONGE GAUZESTER 4 X 4 4PLY - (128PK/CA)

## (undated) DEVICE — NEPTUNE 4 PORT MANIFOLD - (20/PK)

## (undated) DEVICE — STAPLER SKIN DISP - (6/BX 10BX/CA) VISISTAT

## (undated) DEVICE — GOWN SURGEONS LARGE - (32/CA)

## (undated) DEVICE — GLOVE BIOGEL SZ 8 SURGICAL PF LTX - (50PR/BX 4BX/CA)

## (undated) DEVICE — GLOVE BIOGEL PI ORTHO SZ 6 SURGICAL PF LF (40PR/BX)

## (undated) DEVICE — HEADREST PRONEVIEW LARGE - (10/CA)

## (undated) DEVICE — SET EXTENSION WITH 2 PORTS (48EA/CA) ***PART #2C8610 IS A SUBSTITUTE*****

## (undated) DEVICE — DRAPE LAPAROTOMY T SHEET - (12EA/CA)

## (undated) DEVICE — SYRINGE 10 ML CONTROL LL (25EA/BX 4BX/CA)

## (undated) DEVICE — KIT ANESTHESIA W/CIRCUIT & 3/LT BAG W/FILTER (20EA/CA)

## (undated) DEVICE — HEAD HOLDER JUNIOR/ADULT

## (undated) DEVICE — GOWN WARMING STANDARD FLEX - (30/CA)

## (undated) DEVICE — SUCTION INSTRUMENT YANKAUER BULBOUS TIP W/O VENT (50EA/CA)

## (undated) DEVICE — BLADE SURGICAL #15 - (50/BX 3BX/CA)

## (undated) DEVICE — LACTATED RINGERS INJ 1000 ML - (14EA/CA 60CA/PF)

## (undated) DEVICE — CHLORAPREP 26 ML APPLICATOR - ORANGE TINT(25/CA)

## (undated) DEVICE — SODIUM CHL IRRIGATION 0.9% 1000ML (12EA/CA)

## (undated) DEVICE — ELECTRODE 850 FOAM ADHESIVE - HYDROGEL RADIOTRNSPRNT (50/PK)